# Patient Record
Sex: MALE | Race: OTHER | NOT HISPANIC OR LATINO | URBAN - METROPOLITAN AREA
[De-identification: names, ages, dates, MRNs, and addresses within clinical notes are randomized per-mention and may not be internally consistent; named-entity substitution may affect disease eponyms.]

---

## 2020-06-23 ENCOUNTER — INPATIENT (INPATIENT)
Facility: HOSPITAL | Age: 30
LOS: 21 days | Discharge: HOME | End: 2020-07-15
Attending: PSYCHIATRY & NEUROLOGY | Admitting: PSYCHIATRY & NEUROLOGY
Payer: MEDICAID

## 2020-06-23 VITALS
RESPIRATION RATE: 20 BRPM | SYSTOLIC BLOOD PRESSURE: 132 MMHG | DIASTOLIC BLOOD PRESSURE: 70 MMHG | HEART RATE: 89 BPM | TEMPERATURE: 99 F | OXYGEN SATURATION: 97 %

## 2020-06-23 DIAGNOSIS — F29 UNSPECIFIED PSYCHOSIS NOT DUE TO A SUBSTANCE OR KNOWN PHYSIOLOGICAL CONDITION: ICD-10-CM

## 2020-06-23 LAB
AMMONIA BLD-MCNC: <10 UMOL/L — LOW (ref 11–55)
ANION GAP SERPL CALC-SCNC: 16 MMOL/L — HIGH (ref 7–14)
APAP SERPL-MCNC: <5 UG/ML — LOW (ref 10–30)
APPEARANCE UR: CLEAR — SIGNIFICANT CHANGE UP
BASOPHILS # BLD AUTO: 0.03 K/UL — SIGNIFICANT CHANGE UP (ref 0–0.2)
BASOPHILS NFR BLD AUTO: 0.3 % — SIGNIFICANT CHANGE UP (ref 0–1)
BILIRUB UR-MCNC: NEGATIVE — SIGNIFICANT CHANGE UP
BUN SERPL-MCNC: 17 MG/DL — SIGNIFICANT CHANGE UP (ref 10–20)
CALCIUM SERPL-MCNC: 10.7 MG/DL — HIGH (ref 8.5–10.1)
CHLORIDE SERPL-SCNC: 100 MMOL/L — SIGNIFICANT CHANGE UP (ref 98–110)
CO2 SERPL-SCNC: 23 MMOL/L — SIGNIFICANT CHANGE UP (ref 17–32)
COLOR SPEC: YELLOW — SIGNIFICANT CHANGE UP
CREAT SERPL-MCNC: 1 MG/DL — SIGNIFICANT CHANGE UP (ref 0.7–1.5)
DIFF PNL FLD: NEGATIVE — SIGNIFICANT CHANGE UP
EOSINOPHIL # BLD AUTO: 0.02 K/UL — SIGNIFICANT CHANGE UP (ref 0–0.7)
EOSINOPHIL NFR BLD AUTO: 0.2 % — SIGNIFICANT CHANGE UP (ref 0–8)
ETHANOL SERPL-MCNC: <10 MG/DL — SIGNIFICANT CHANGE UP
GLUCOSE SERPL-MCNC: 106 MG/DL — HIGH (ref 70–99)
GLUCOSE UR QL: NEGATIVE — SIGNIFICANT CHANGE UP
HCT VFR BLD CALC: 46.1 % — SIGNIFICANT CHANGE UP (ref 42–52)
HGB BLD-MCNC: 16.3 G/DL — SIGNIFICANT CHANGE UP (ref 14–18)
IMM GRANULOCYTES NFR BLD AUTO: 0.2 % — SIGNIFICANT CHANGE UP (ref 0.1–0.3)
KETONES UR-MCNC: ABNORMAL
LEUKOCYTE ESTERASE UR-ACNC: NEGATIVE — SIGNIFICANT CHANGE UP
LYMPHOCYTES # BLD AUTO: 1.64 K/UL — SIGNIFICANT CHANGE UP (ref 1.2–3.4)
LYMPHOCYTES # BLD AUTO: 16.4 % — LOW (ref 20.5–51.1)
MCHC RBC-ENTMCNC: 30.3 PG — SIGNIFICANT CHANGE UP (ref 27–31)
MCHC RBC-ENTMCNC: 35.4 G/DL — SIGNIFICANT CHANGE UP (ref 32–37)
MCV RBC AUTO: 85.7 FL — SIGNIFICANT CHANGE UP (ref 80–94)
MONOCYTES # BLD AUTO: 0.4 K/UL — SIGNIFICANT CHANGE UP (ref 0.1–0.6)
MONOCYTES NFR BLD AUTO: 4 % — SIGNIFICANT CHANGE UP (ref 1.7–9.3)
NEUTROPHILS # BLD AUTO: 7.91 K/UL — HIGH (ref 1.4–6.5)
NEUTROPHILS NFR BLD AUTO: 78.9 % — HIGH (ref 42.2–75.2)
NITRITE UR-MCNC: NEGATIVE — SIGNIFICANT CHANGE UP
NRBC # BLD: 0 /100 WBCS — SIGNIFICANT CHANGE UP (ref 0–0)
PH UR: 6 — SIGNIFICANT CHANGE UP (ref 5–8)
PLATELET # BLD AUTO: 266 K/UL — SIGNIFICANT CHANGE UP (ref 130–400)
POTASSIUM SERPL-MCNC: 4.8 MMOL/L — SIGNIFICANT CHANGE UP (ref 3.5–5)
POTASSIUM SERPL-SCNC: 4.8 MMOL/L — SIGNIFICANT CHANGE UP (ref 3.5–5)
PROT UR-MCNC: NEGATIVE — SIGNIFICANT CHANGE UP
RBC # BLD: 5.38 M/UL — SIGNIFICANT CHANGE UP (ref 4.7–6.1)
RBC # FLD: 12 % — SIGNIFICANT CHANGE UP (ref 11.5–14.5)
SALICYLATES SERPL-MCNC: <0.3 MG/DL — LOW (ref 4–30)
SARS-COV-2 RNA SPEC QL NAA+PROBE: SIGNIFICANT CHANGE UP
SODIUM SERPL-SCNC: 139 MMOL/L — SIGNIFICANT CHANGE UP (ref 135–146)
SP GR SPEC: 1.02 — SIGNIFICANT CHANGE UP (ref 1.01–1.02)
UROBILINOGEN FLD QL: SIGNIFICANT CHANGE UP
WBC # BLD: 10.02 K/UL — SIGNIFICANT CHANGE UP (ref 4.8–10.8)
WBC # FLD AUTO: 10.02 K/UL — SIGNIFICANT CHANGE UP (ref 4.8–10.8)

## 2020-06-23 PROCEDURE — 99285 EMERGENCY DEPT VISIT HI MDM: CPT

## 2020-06-23 PROCEDURE — 71046 X-RAY EXAM CHEST 2 VIEWS: CPT | Mod: 26

## 2020-06-23 PROCEDURE — 93010 ELECTROCARDIOGRAM REPORT: CPT

## 2020-06-23 PROCEDURE — 70450 CT HEAD/BRAIN W/O DYE: CPT | Mod: 26

## 2020-06-23 PROCEDURE — 90792 PSYCH DIAG EVAL W/MED SRVCS: CPT | Mod: 95

## 2020-06-23 NOTE — ED BEHAVIORAL HEALTH ASSESSMENT NOTE - HPI (INCLUDE ILLNESS QUALITY, SEVERITY, DURATION, TIMING, CONTEXT, MODIFYING FACTORS, ASSOCIATED SIGNS AND SYMPTOMS)
· Chief Complaint Quote	pt states he can't walk (patient is walking in triage). states he "wants god to take me" and "I want to die" but states no suicide. denies any HI. denies any drug/alcohol use    BAL negative, Patient is a 29 yo unemployed male; domiciled with dad; single; non caregiver; with self reported psych hx of schizophrenia; multiple prior hospitalizations (states most recently at [facility]); denies prior SA or NSSIB; denies alcohol or drug use; denies medical hx; unknown hx of aggression or legal charges who presents via EMS for psychotic behavior. BAL negative, Utox pending. Patient initially reported difficulty with walking, however was noted to be walking around ED. CT head was negative.     On interview, patient is standing very close to the camera and rocking back and forth between left foot and right throughout interview. States that he is very restless and unable to sit down. Reports that he is here in the ED because "I can't walk, I feel unbalanced, my coordination is off". Also, reports intrusive and distressing thoughts. When asked to elaborate, patient quietly reports, "they are attacking my [employer]ec[employer] constantly, I am Scientologist". When asked to give details, patient reports "I don't want to speak of them, I am afraid I am going to hell". States that he is scared he is going to be judged in afterlife. Denies AH and perseverates that these are not voices or hallucinations but "intrusive thoughts". Reports his mood, poor energy/concentration/appetite. Denies any suicidal thoughts, behaviors, or intentions. Denies any prior SA. Denies any HI. Denies any manic symptoms. Admits to smoking 1 ppd, but denies alcohol or other tobacco use. Reports that he was admitted last because "I felt like I was going to pass away". Not currently in treatment or taking any psychiatric medications.     Date: 6-23-20 Time: 7:16 Clinician Name: Johnson Pt Location: [facility] Pt Name: Umberto Hadley Pt MRN: [id number]    Records checked/sent to BTA: [place] [facility]    Records checked- no data: [facility], Forensic databases (nysdoccslookup and [employer]), [facility], [facility], [facility], [facility], [facility] ED Visits, [facility], [facility], [facility], [facility], [facility], [facility], [facility], [facility], [facility], [facility]

## 2020-06-23 NOTE — ED ADULT TRIAGE NOTE - NS_BH TRG QUESTION3_ED_ALL_ED
This is a  pt Spoke to pt she states she is having lower abd. Pain no fever,chills or vomiting. Pt states she is taking pyriduim,oxybutynin an ibuprofen but states this is not helping with the pain. She is asking if she can get an rx for pain medication. I did advised her that  was gone for the day but I would send a message to  an would call her back with an answer. Pt is aware pain medication can not be called into pharmacy she states her daughter will  rx. Please advise - amalia   No

## 2020-06-23 NOTE — ED ADULT TRIAGE NOTE - CHIEF COMPLAINT QUOTE
pt states he can't walk (patient is walking in triage). states he "wants god to take me" and "I want to die" but states no suicide. denies any HI. denies any drug/alcohol use

## 2020-06-23 NOTE — ED ADULT NURSE NOTE - OBJECTIVE STATEMENT
Patient c/o inability to walk x 3 weeks and "uneasy feeling"- patient ambulating self with a steady gait. Denies any pain or discomfort, patient denies any HI/SI at this time. Patient states he wishes to kill himself in triage 1:1 constant obs initiated in triage. PAtient states he has a PMH of schizophrenia but stopped taking his meds months ago. Patient calm at this time. Patient denies drug or alcohol use. Will continue to monitor.

## 2020-06-23 NOTE — ED PROVIDER NOTE - CLINICAL SUMMARY MEDICAL DECISION MAKING FREE TEXT BOX
Pt presenting with worsening paranoia and persecutory thoughts. labs ekg reviewed. sp psychiatry eval. will admit for further care.

## 2020-06-23 NOTE — ED PROVIDER NOTE - PHYSICAL EXAMINATION
CONSTITUTIONAL: Well-appearing; well-nourished; in no apparent distress.   EYES: PERRL; EOM intact. .   NECK: Supple; non-tender; no cervical lymphadenopathy.   CARDIOVASCULAR: Normal S1, S2; no murmurs, rubs, or gallops.   RESPIRATORY: Normal chest excursion with respiration; breath sounds clear and equal bilaterally; no wheezes, rhonchi, or rales.  GI/: Normal bowel sounds; non-distended; non-tender; no palpable organomegaly.   MS: No evidence of trauma or deformity.  Normal ROM in all four extremities; non-tender to palpation; distal pulses are normal.   SKIN: Normal for age and race; warm; dry; good turgor; no apparent lesions or exudate.   NEURO/PSYCH: A & O x 3; grossly unremarkable. mood and manner are appropriate. Grooming and personal hygiene are appropriate. No apparent thoughts of harm to self or others. No focal deficits. No facial droop. NO tongue deviation. Cerebellar intact. Normal gait. Strength equal b/l 5/5 throughout. Sensation intact. Neg romberg

## 2020-06-23 NOTE — ED PROVIDER NOTE - OBJECTIVE STATEMENT
30 year old M hx of anxiety, schizoaffective d/o (stopped taking meds x mo ago) c/o mult complaints. Pt sts does not feel right and is having "bad thought." Pt sts he is afraid that God will punish him because of his bad thoughts. Pt also sts he is unable to walk and has been to [facility] recently for symptoms. Pt ambulating well in ED with steady gait. Currently denies any drug/etoh use, SI, HI, past suicide attempts, hallucinations, leg/back pain, injuries/trauma, chest pain, sob.

## 2020-06-23 NOTE — ED BEHAVIORAL HEALTH ASSESSMENT NOTE - DETAILS
Cristians denies si or prior SA "can't walk" patient activated EMS handoff provided contact attempted

## 2020-06-23 NOTE — ED BEHAVIORAL HEALTH ASSESSMENT NOTE - OTHER
external billing see above standing very close to camera despite redirection constantly rocking from side to side per ED, patient has normal gait, unable to assess because patient is so close to camera bad denies AVH, but appears to be responding

## 2020-06-23 NOTE — ED BEHAVIORAL HEALTH NOTE - BEHAVIORAL HEALTH NOTE
===================  PRE-HOSPITAL COURSE  ===================    SOURCE: Chart    DETAILS:  Arrived via EMS by himself complaining of not being able to walk for several weeks made statements that he “wants go to take me” and “I want to die”. Patient also noted to be walking in triage     ============  ED COURSE   ============    SOURCE: ED RN, Chart    ARRIVAL: EMS    BELONGINGS: No items of note    BEHAVIOR: Patient arrived alert and oriented x3, patient was somewhat malodorous. Patient reported that he can’t walk despite ambulating in the ED. He admitted to feeling “uneasy” but otherwise no psychiatric complaints and denying SI/HI. Patient didn’t appear overtly bizarre or psychotic to RN, thought process seemed linear and speech WNL. Patient remained in good behavioral control while in the ED, he was able to eat during ED course.    TREATMENT: No PRN psychiatric medication prior to assessment    VISITORS: None    ========================  COLLATERAL  ========================    NAME: Abiola    NUMBER: [phone]    RELATIONSHIP: Father    COMMENTS: Left voicemail requesting return call ===================  PRE-HOSPITAL COURSE  ===================    SOURCE: Chart    DETAILS:  Arrived via EMS by himself complaining of not being able to walk for several weeks made statements that he “wants go to take me” and “I want to die”. Patient also noted to be walking in triage     ============  ED COURSE   ============    SOURCE: ED RN, Chart    ARRIVAL: EMS    BELONGINGS: No items of note    BEHAVIOR: Patient arrived alert and oriented x3, patient was somewhat malodorous. Patient reported that he can’t walk despite ambulating in the ED. He admitted to feeling “uneasy” but otherwise no psychiatric complaints and denying SI/HI. Patient didn’t appear overtly bizarre or psychotic to RN, thought process seemed linear and speech WNL. Patient remained in good behavioral control while in the ED but noted to be restless at times and pacing, he was able to eat during ED course.    TREATMENT: No PRN psychiatric medication prior to assessment    VISITORS: None    ========================  COLLATERAL  ========================    NAME: Abiola    NUMBER: [phone]    RELATIONSHIP: Father    COMMENTS: Left voicemail requesting return call

## 2020-06-23 NOTE — ED ADULT NURSE NOTE - NSIMPLEMENTINTERV_GEN_ALL_ED
Implemented All Universal Safety Interventions:  [place] to call system. Call bell, personal items and telephone within reach. Instruct patient to call for assistance. Room bathroom lighting operational. Non-slip footwear when patient is off stretcher. Physically safe environment: no spills, clutter or unnecessary equipment. Stretcher in lowest position, wheels locked, appropriate side rails in place.

## 2020-06-23 NOTE — ED PROVIDER NOTE - NS ED ROS FT
Constitutional: no fever, chills, no recent weight loss, change in appetite or malaise  Cardiac: No chest pain, SOB or edema.  Respiratory: No cough or respiratory distress  GI: No nausea, vomiting, diarrhea or abdominal pain.  : No dysuria, frequency, urgency or hematuria  MS: Pt sts having difficulty walking  Neuro: No headache or weakness. No LOC.  Skin: No skin rash.  Psych: No SI/HI, hallucinations, delusions  Endocrine: No history of thyroid disease or diabetes.  Except as documented in the HPI, all other systems are negative.

## 2020-06-23 NOTE — ED PROVIDER NOTE - ATTENDING CONTRIBUTION TO CARE
31 yo male with pmh of schizophrenia presents to ER for "intrusive thoughts" and states "I want to die'. Pt with complaint of not being able to walk, and states he went to [facility] and [facility] for eval--pt is however walking around without distress, no neuro complaints. Pt having bizarre thoughts, keeps repeating his inability to ambulate while ambulating, saying he's not feeling right, that he feels persecuted. Pt has no signs of self injury. Denies taking his meds, admits to marijuana use. Not following up with his psych MD. Exam as per PA note. Will check labs, ekg, consult psych.

## 2020-06-23 NOTE — ED PROVIDER NOTE - PROGRESS NOTE DETAILS
Telepsych to be called again Telepsych MD Rocha wants to admit pt but no beds, offer pt resperidal, and do behaviour health hold for now Telepsych MD Ornelas wants to admit pt but no beds, offer pt resperidal, and do behaviour health hold for now Pt s/o to me by Dr. Guzman pending bed availability for psych admission. Pt s/o to Dr. Lockwood to pending psych reeval and bed availability.

## 2020-06-23 NOTE — ED BEHAVIORAL HEALTH ASSESSMENT NOTE - PSYCHIATRIC ISSUES AND PLAN (INCLUDE STANDING AND PRN MEDICATION)
Start risperdal 1 mg BID, patient refuses can offer ativan 1 mg. PRNS: haldol 5mg, ativan 2mg, diphenhydramine 50mg, PO/IM, Q6H for Agitation

## 2020-06-24 DIAGNOSIS — F25.0 SCHIZOAFFECTIVE DISORDER, BIPOLAR TYPE: ICD-10-CM

## 2020-06-24 PROCEDURE — 90792 PSYCH DIAG EVAL W/MED SRVCS: CPT | Mod: GC

## 2020-06-24 RX ORDER — BENZTROPINE MESYLATE 1 MG
0.5 TABLET ORAL
Refills: 0 | Status: DISCONTINUED | OUTPATIENT
Start: 2020-06-24 | End: 2020-07-15

## 2020-06-24 RX ORDER — RISPERIDONE 4 MG/1
1 TABLET ORAL
Refills: 0 | Status: DISCONTINUED | OUTPATIENT
Start: 2020-06-24 | End: 2020-06-30

## 2020-06-24 RX ORDER — HYDROXYZINE HCL 10 MG
50 TABLET ORAL AT BEDTIME
Refills: 0 | Status: DISCONTINUED | OUTPATIENT
Start: 2020-06-24 | End: 2020-07-13

## 2020-06-24 RX ORDER — HALOPERIDOL DECANOATE 100 MG/ML
2.5 INJECTION INTRAMUSCULAR EVERY 6 HOURS
Refills: 0 | Status: DISCONTINUED | OUTPATIENT
Start: 2020-06-24 | End: 2020-06-24

## 2020-06-24 RX ORDER — HALOPERIDOL DECANOATE 100 MG/ML
5 INJECTION INTRAMUSCULAR EVERY 6 HOURS
Refills: 0 | Status: DISCONTINUED | OUTPATIENT
Start: 2020-06-24 | End: 2020-07-15

## 2020-06-24 RX ORDER — HYDROXYZINE HCL 10 MG
50 TABLET ORAL EVERY 6 HOURS
Refills: 0 | Status: DISCONTINUED | OUTPATIENT
Start: 2020-06-24 | End: 2020-06-24

## 2020-06-24 RX ADMIN — RISPERIDONE 1 MILLIGRAM(S): 4 TABLET ORAL at 20:13

## 2020-06-24 RX ADMIN — Medication 0.5 MILLIGRAM(S): at 20:13

## 2020-06-24 NOTE — ED ADULT NURSE REASSESSMENT NOTE - NS ED NURSE REASSESS COMMENT FT1
1:1 constant observation initiated in triage
patient awake/alert/oriented. v/s stable. no acute distress.  safety maintained. 1:1 constant observation continues
Psych-Intern called to check how pt is doing since she last , pt was asleep then, woke up and said he wants to go home. MD finally called, spoke to pt, who cut it short went back to sleep.
Received from outgoing RN alert and anxious . pt wa talking to Tele psych, with 1:1aid by door way. Pt came out 10mins later. He is currently pacing back and forth, looking  anxious, not talking to anyone. Safety precautions in place.

## 2020-06-24 NOTE — ED BEHAVIORAL HEALTH NOTE - BEHAVIORAL HEALTH NOTE
Chief Complaint  " I came for medical".    Interval History  Patient seen and interviewed today. he is observed to be internally preoccupied and responding to internal stimuli.   He reports that he did not come to the Ed for psychiatric reasons but because he couldn't walk. Patient however reports that he can walk now and wants to go home. Patient states" my mum will be very upset with me". Patient denies hearing voices or feeling that people are against him. he reports that he does not take any psychiatric medication because they do not work and they don't help his anxiety. He denies acute symptoms of caridad and depression and denies suicidal thoughts , intent or plan.   Patient reports that he has an act team and he received his injection a couple of weeks ago. When asked for the name of his ACT team and their phone number, patient reports that he did not have this information. When asked for his mother's name and phone number , patient reports that he does not have the information. Patient became agitated however he was verbally re-directable.     Mental Status Examination  Patient is awake and alert , observed to be able to walk , appears dishevelled and unkempt, well oriented in time, place and persons, speech is normal in rate , volume, quality , quality, mood is " i'm fine ", Affect is constricted , thought process is concrete , thought content - denies suicidal ideations, denies paranoids delusions, perceptions - denies auditory hallucinations , insight and judgement is poor     Vitals Signs   Vital Signs Last 24 Hrs  T(C): 36.2 (24 Jun 2020 08:30), Max: 37.2 (23 Jun 2020 15:49)  T(F): 97.2 (24 Jun 2020 08:30), Max: 99 (23 Jun 2020 15:49)  HR: 78 (24 Jun 2020 08:30) (78 - 89)  BP: 112/59 (24 Jun 2020 08:30) (112/59 - 132/70)  BP(mean): --  RR: 18 (24 Jun 2020 08:30) (18 - 20)  SpO2: 98% (24 Jun 2020 08:30) (97% - 98%)    Labs:                        16.3   10.02 )-----------( 266      ( 23 Jun 2020 16:29 )             46.1       06-23    139  |  100  |  17  ----------------------------<  106<H>  4.8   |  23  |  1.0    Ca    10.7<H>      23 Jun 2020 16:29        Assessment:  Mr Hadley is a 30 year old man with a documented history of Schizophrenia who presented to the ED for the evaluation of difficulty walking. Psychiatry consult was called for the evaluation of psychosis. patient was initially seen by the telepsychiatry service.   Patient is disorganised in behavior and thought, does not seem to be able to take care of himself , it is unclear if his reported difficulty walking is a delusion versus actual physical symptom, appears internally preoccupied and responding to internal stimuli. it is unclear if patient is in psychiatric treatment as he reports with an ACT team as there doesn't appear to be any such records.   At this time, patient is considered a danger to himself and others and needs inpatient psychiatric hospitalization for medication management and symptom stabilization. Patient will be stated on Risperdal 1mg p.o BID  psychosis and Cogentin 0.5mg P.O BID for possible EPS. It will be beneficial to obtain collateral information form his family and to confirm if he indeed has an ACT team and to confirm if he indeed received a long acting injectable medication recently. Patient will benefit from a medical or neurology consult to determine if he there is a medical cause for his reported difficultuy walking.     Plan:   1. Recommend starting psychotropic medication   Risperdal 1mg p.o BID   Cogentin 0.5mg P.O BID  2. Transfer to the Inpatient psychiatric unit upon bed availability and medical clearance   3. Recommend contacting patient's family and ACT team for collateral information.

## 2020-06-24 NOTE — ED BEHAVIORAL HEALTH NOTE - BEHAVIORAL HEALTH NOTE
Reassessment  Spoke with nurse caring for patient, who reports that patient was pacing and anxious earlier. Asking why he has to say and stating that came to the hospital for his walk. Requested a stretcher to lay down in. On interview, patient reports that medications don't tell him and that he needs an MRI to see why he can't walk. Continues to be bizarre and perseverate on walking.       Patient is a 29 yo unemployed male; domiciled with dad; single; non caregiver; with self reported psych hx of schizophrenia; multiple prior hospitalizations (states most recently at [facility]); denies prior SA or NSSIB; denies alcohol or drug use; denies medical hx; unknown hx of aggression or legal charges who presents via EMS for psychotic behavior. BAL negative, Utox pending. Patient initially reported difficulty with walking, however was noted to be walking around ED. CT head was negative. On interview, patient is endorsing paranoid and Jew delusions with PMA. Waiting call back from dad for collateral. Holding for bed with planning involuntary admission if patient continues to be this symptomatic pending bed availability and Covid testing.    -holding for bed  -start risperdal 1 mg BID, PRNS: haldol 5mg, ativan 2mg, diphenhydramine 50mg, PO/IM, Q6H for Agitation

## 2020-06-24 NOTE — PROGRESS NOTE BEHAVIORAL HEALTH - NSBHFUPINTERVALCCFT_PSY_A_CORE
chart reviewed and patient interviewed. he admits to not taking his meds, but agrees to comply with meds and treatment plan "as long as I don't have to stay too long". No s/h ideation. pt is disorganized and vague, but no overt delusions or s/h ideation

## 2020-06-25 PROCEDURE — 99232 SBSQ HOSP IP/OBS MODERATE 35: CPT

## 2020-06-25 PROCEDURE — 99231 SBSQ HOSP IP/OBS SF/LOW 25: CPT

## 2020-06-25 RX ADMIN — HALOPERIDOL DECANOATE 5 MILLIGRAM(S): 100 INJECTION INTRAMUSCULAR at 13:38

## 2020-06-25 RX ADMIN — RISPERIDONE 1 MILLIGRAM(S): 4 TABLET ORAL at 20:23

## 2020-06-25 RX ADMIN — Medication 2 MILLIGRAM(S): at 16:28

## 2020-06-25 RX ADMIN — RISPERIDONE 1 MILLIGRAM(S): 4 TABLET ORAL at 08:59

## 2020-06-25 RX ADMIN — Medication 0.5 MILLIGRAM(S): at 20:23

## 2020-06-25 RX ADMIN — Medication 0.5 MILLIGRAM(S): at 08:59

## 2020-06-25 NOTE — CONSULT NOTE ADULT - PROBLEM SELECTOR RECOMMENDATION 9
continue present treatment as per psych plan as reviewed  Medically stable with no new changes in treatment  will continue to monitor medical status while being treated on psych  labs all ok

## 2020-06-25 NOTE — CONSULT NOTE ADULT - SUBJECTIVE AND OBJECTIVE BOX
ANTONINA SHAFFER  30y  Male      Patient is a 30y old  Male who presents with a chief complaint of   HPI:    INTERVAL HPI/OVERNIGHT EVENTS:  HEALTH ISSUES - PROBLEM Dx:  Schizoaffective disorder, bipolar type  Psychosis, unspecified psychosis type        PAST MEDICAL & SURGICAL HISTORY:  Schizophrenia    FAMILY HISTORY:    benztropine 0.5 milliGRAM(s) Oral two times a day  haloperidol     Tablet 5 milliGRAM(s) Oral every 6 hours PRN  hydrOXYzine hydrochloride 50 milliGRAM(s) Oral at bedtime PRN  LORazepam     Tablet 2 milliGRAM(s) Oral every 6 hours PRN  risperiDONE   Tablet 1 milliGRAM(s) Oral two times a day      REVIEW OF SYSTEMS:  CONSTITUTIONAL: No fever, weight loss, or fatigue  EYES: No eye pain, visual disturbances, or discharge  ENMT:  No difficulty hearing, tinnitus, vertigo; No sinus or throat pain  NECK: No pain or stiffness  BREASTS: No pain, masses, or nipple discharge  RESPIRATORY: No cough, wheezing, chills or hemoptysis; No shortness of breath  CARDIOVASCULAR: No chest pain, palpitations, dizziness, or leg swelling  GASTROINTESTINAL: No abdominal or epigastric pain. No nausea, vomiting, or hematemesis; No diarrhea or constipation. No melena or hematochezia.  GENITOURINARY: No dysuria, frequency, hematuria, or incontinence  NEUROLOGICAL: No headaches, memory loss, loss of strength, numbness, or tremors  SKIN: No itching, burning, rashes, or lesions   LYMPH NODES: No enlarged glands  ENDOCRINE: No heat or cold intolerance; No hair loss  MUSCULOSKELETAL: No joint pain or swelling; No muscle, back, or extremity pain  PSYCHIATRIC: as per hpi and previous psych history  HEME/LYMPH: No easy bruising, or bleeding gums  ALLERY AND IMMUNOLOGIC: No hives or eczema    T(C): 35.4 (20 @ 21:44), Max: 36.4 (20 @ 14:25)  HR: 78 (20 @ 21:44) (78 - 84)  BP: 99/58 (20 @ 21:44) (99/58 - 132/61)  RR: 16 (20 @ 21:44) (16 - 16)  SpO2: --  Wt(kg): --Vital Signs Last 24 Hrs  T(C): 35.4 (2020 21:44), Max: 36.4 (2020 14:25)  T(F): 95.8 (2020 21:44), Max: 97.6 (2020 14:25)  HR: 78 (2020 21:44) (78 - 84)  BP: 99/58 (2020 21:44) (99/58 - 132/61)  BP(mean): --  RR: 16 (2020 21:44) (16 - 16)  SpO2: --    PHYSICAL EXAM:  GENERAL: NAD,well-developed  HEAD:  Atraumatic, Normocephalic  EYES: EOMI, PERRLA, conjunctiva and sclera clear  ENMT: ; Moist mucous membranes, Good dentition, No lesions  NECK: Supple, No JVD, Normal thyroid  CHEST/LUNG: Clear bs bilaterally; No rales, rhonchi, wheezing  HEART: Regular rate and rhythm; No murmurs, rubs, or gallops  ABDOMEN: Soft, Nontender, Nondistended; Bowel sounds present  EXTREMITIES:  2+ Peripheral Pulses, No clubbing, cyanosis, or edema  LYMPH: No lymphadenopathy noted  SKIN: No rashes or lesions  Neuro: alert  no focal deficits    Consultant(s) Notes Reviewed:  [x ] YES  [ ] NO  Care Discussed with Consultants/Other Providers [ x] YES  [ ] NO    LABS:                        16.3   10.02 )-----------( 266      ( 2020 16:29 )             46.1     06-    139  |  100  |  17  ----------------------------<  106<H>  4.8   |  23  |  1.0    Ca    10.7<H>      2020 16:29        Urinalysis Basic - ( 2020 16:29 )    Color: Yellow / Appearance: Clear / S.019 / pH: x  Gluc: x / Ketone: Small  / Bili: Negative / Urobili: <2 mg/dL   Blood: x / Protein: Negative / Nitrite: Negative   Leuk Esterase: Negative / RBC: x / WBC x   Sq Epi: x / Non Sq Epi: x / Bacteria: x      CAPILLARY BLOOD GLUCOSE            Urinalysis Basic - ( 2020 16:29 )    Color: Yellow / Appearance: Clear / S.019 / pH: x  Gluc: x / Ketone: Small  / Bili: Negative / Urobili: <2 mg/dL   Blood: x / Protein: Negative / Nitrite: Negative   Leuk Esterase: Negative / RBC: x / WBC x   Sq Epi: x / Non Sq Epi: x / Bacteria: x        RADIOLOGY & ADDITIONAL TESTS:    Imaging Personally Reviewed:  [ ] YES  [ ] NO

## 2020-06-25 NOTE — PROGRESS NOTE BEHAVIORAL HEALTH - NSBHFUPINTERVALCCFT_PSY_A_CORE
Pt is calm and pleasant on approach. No s/h ideation. Pt complying with meds; no overt psychosis. Message left with father; no response yet.

## 2020-06-26 LAB
AMPHET UR-MCNC: NEGATIVE — SIGNIFICANT CHANGE UP
BARBITURATES, URINE.: NEGATIVE — SIGNIFICANT CHANGE UP
BENZODIAZ UR-MCNC: NEGATIVE — SIGNIFICANT CHANGE UP
COCAINE METAB.OTHER UR-MCNC: NEGATIVE — SIGNIFICANT CHANGE UP
CREATININE, URINE THERAPEUTIC: 117.3 MG/DL — SIGNIFICANT CHANGE UP
METHADONE UR-MCNC: NEGATIVE — SIGNIFICANT CHANGE UP
METHAQUALONE UR QL: NEGATIVE — SIGNIFICANT CHANGE UP
METHAQUALONE UR-MCNC: NEGATIVE — SIGNIFICANT CHANGE UP
OPIATES UR-MCNC: NEGATIVE — SIGNIFICANT CHANGE UP
PCP UR-MCNC: NEGATIVE — SIGNIFICANT CHANGE UP
PROPOXYPH UR QL: NEGATIVE — SIGNIFICANT CHANGE UP
THC UR QL: NEGATIVE — SIGNIFICANT CHANGE UP

## 2020-06-26 PROCEDURE — 99231 SBSQ HOSP IP/OBS SF/LOW 25: CPT

## 2020-06-26 RX ADMIN — RISPERIDONE 1 MILLIGRAM(S): 4 TABLET ORAL at 20:21

## 2020-06-26 RX ADMIN — Medication 0.5 MILLIGRAM(S): at 20:21

## 2020-06-26 RX ADMIN — Medication 0.5 MILLIGRAM(S): at 08:20

## 2020-06-26 RX ADMIN — RISPERIDONE 1 MILLIGRAM(S): 4 TABLET ORAL at 08:20

## 2020-06-26 NOTE — PROGRESS NOTE BEHAVIORAL HEALTH - NSBHFUPINTERVALCCFT_PSY_A_CORE
Pt is complying with meds. No s/h ideation. poverty of thought and derailment are evident, but pt is less anxious.  No clear delusions evident

## 2020-06-27 PROCEDURE — 99231 SBSQ HOSP IP/OBS SF/LOW 25: CPT

## 2020-06-27 RX ADMIN — Medication 0.5 MILLIGRAM(S): at 20:02

## 2020-06-27 RX ADMIN — RISPERIDONE 1 MILLIGRAM(S): 4 TABLET ORAL at 20:02

## 2020-06-27 RX ADMIN — Medication 0.5 MILLIGRAM(S): at 08:16

## 2020-06-27 RX ADMIN — RISPERIDONE 1 MILLIGRAM(S): 4 TABLET ORAL at 08:16

## 2020-06-27 NOTE — PROGRESS NOTE BEHAVIORAL HEALTH - NSBHFUPINTERVALHXFT_PSY_A_CORE
chart reviewed , discussed with staff , pt seen .  p[t lying in bed , religiously preoccupied Pt is complying with meds. No s/h ideation. poverty of thought and derailment are evident, but pt is less anxious.  No clear delusions evident

## 2020-06-28 PROCEDURE — 99231 SBSQ HOSP IP/OBS SF/LOW 25: CPT

## 2020-06-28 RX ADMIN — Medication 0.5 MILLIGRAM(S): at 08:52

## 2020-06-28 RX ADMIN — RISPERIDONE 1 MILLIGRAM(S): 4 TABLET ORAL at 08:52

## 2020-06-28 RX ADMIN — RISPERIDONE 1 MILLIGRAM(S): 4 TABLET ORAL at 20:20

## 2020-06-28 RX ADMIN — Medication 0.5 MILLIGRAM(S): at 20:20

## 2020-06-28 NOTE — PROGRESS NOTE BEHAVIORAL HEALTH - NSBHFUPINTERVALHXFT_PSY_A_CORE
Patient seen and evaluated. Patient reports that he is feeling "fine." He denies any new symptoms or concerns. Patient reports that he "just wants to sleep." Patient denies any SI/HI/AVH.

## 2020-06-28 NOTE — PROGRESS NOTE BEHAVIORAL HEALTH - SUMMARY
Patient is a 30 year old man with a documented history of Schizophrenia who initially presented to the ED for the evaluation of difficulty walking. Patient was admitted to the IPP unit for psychosis and inability to care for self. Today, patient does not appear to be responding to internal stimuli. He wanted to go back to sleep. Patient is on a low dose of Risperdal, may need to titrate but does not appear to have any active concerns at this time.    1. Risperdal 1 mg BID po  2. Ativan/Haldol prn for agitation

## 2020-06-28 NOTE — PROGRESS NOTE BEHAVIORAL HEALTH - CASE SUMMARY
29 yo man admitted for exacerbation of chronic psychosis. Pt examined and chart reviewed, case discussed with Dr Snowden. Pt is responding to treatment initiated on admission. I agree with Dr Snowden's plan to continue current treatment regimen.

## 2020-06-29 PROCEDURE — 99231 SBSQ HOSP IP/OBS SF/LOW 25: CPT

## 2020-06-29 RX ORDER — RISPERIDONE 4 MG/1
1 TABLET ORAL AT BEDTIME
Refills: 0 | Status: DISCONTINUED | OUTPATIENT
Start: 2020-06-29 | End: 2020-06-30

## 2020-06-29 RX ADMIN — RISPERIDONE 1 MILLIGRAM(S): 4 TABLET ORAL at 08:18

## 2020-06-29 RX ADMIN — Medication 0.5 MILLIGRAM(S): at 20:23

## 2020-06-29 RX ADMIN — Medication 2 MILLIGRAM(S): at 18:58

## 2020-06-29 RX ADMIN — Medication 0.5 MILLIGRAM(S): at 08:18

## 2020-06-29 RX ADMIN — RISPERIDONE 1 MILLIGRAM(S): 4 TABLET ORAL at 20:24

## 2020-06-29 NOTE — PROGRESS NOTE BEHAVIORAL HEALTH - NSBHFUPINTERVALHXFT_PSY_A_CORE
will increase risperdal to address negative symptoms will increase risperdal to address delusions and negative symptoms

## 2020-06-29 NOTE — PROGRESS NOTE BEHAVIORAL HEALTH - NSBHFUPINTERVALCCFT_PSY_A_CORE
Pt is complying with meds and treatment plan.  He is isolative and says he feels "ok", and denies hallucinations.  No s/h ideation.  No contact yet from family. I gave patient my phone number to pass on and he says " they don't like to be bothered". Pt is complying with meds and treatment plan.  He is isolative and says he feels "ok", and denies hallucinations.  No s/h ideation, but he remains concerned about "bad" intrusive thoughts, and asks if G-d will punsih him.  No contact yet from family. I gave patient my phone number to pass on and he says " they don't like to be bothered".

## 2020-06-30 PROCEDURE — 99231 SBSQ HOSP IP/OBS SF/LOW 25: CPT

## 2020-06-30 RX ORDER — RISPERIDONE 4 MG/1
2 TABLET ORAL EVERY 12 HOURS
Refills: 0 | Status: DISCONTINUED | OUTPATIENT
Start: 2020-06-30 | End: 2020-07-08

## 2020-06-30 RX ADMIN — HALOPERIDOL DECANOATE 5 MILLIGRAM(S): 100 INJECTION INTRAMUSCULAR at 21:15

## 2020-06-30 RX ADMIN — Medication 2 MILLIGRAM(S): at 21:15

## 2020-06-30 RX ADMIN — Medication 0.5 MILLIGRAM(S): at 08:21

## 2020-06-30 RX ADMIN — RISPERIDONE 1 MILLIGRAM(S): 4 TABLET ORAL at 08:21

## 2020-06-30 RX ADMIN — RISPERIDONE 2 MILLIGRAM(S): 4 TABLET ORAL at 20:23

## 2020-06-30 RX ADMIN — Medication 0.5 MILLIGRAM(S): at 20:23

## 2020-06-30 NOTE — PROGRESS NOTE BEHAVIORAL HEALTH - NSBHFUPINTERVALCCFT_PSY_A_CORE
no s/h ideation. Pt pleasant on approach, but remains concerned about evil thoughts he is having toward G-d.  No behavior issues.  I spoke with both parents (); neither was able to give me significant history or med hx.

## 2020-07-01 PROCEDURE — 99231 SBSQ HOSP IP/OBS SF/LOW 25: CPT

## 2020-07-01 RX ORDER — NICOTINE POLACRILEX 2 MG
2 GUM BUCCAL
Refills: 0 | Status: DISCONTINUED | OUTPATIENT
Start: 2020-07-01 | End: 2020-07-15

## 2020-07-01 RX ADMIN — RISPERIDONE 2 MILLIGRAM(S): 4 TABLET ORAL at 08:15

## 2020-07-01 RX ADMIN — Medication 0.5 MILLIGRAM(S): at 08:15

## 2020-07-01 RX ADMIN — HALOPERIDOL DECANOATE 5 MILLIGRAM(S): 100 INJECTION INTRAMUSCULAR at 20:20

## 2020-07-01 RX ADMIN — RISPERIDONE 2 MILLIGRAM(S): 4 TABLET ORAL at 20:19

## 2020-07-01 RX ADMIN — Medication 0.5 MILLIGRAM(S): at 20:19

## 2020-07-01 NOTE — PROGRESS NOTE BEHAVIORAL HEALTH - NSBHFUPINTERVALCCFT_PSY_A_CORE
Pt is disorganized and isolative.  He remains preoccupied with bad thoughts of a Jehovah's witness nature, but cannot/will not discuss in detail.  Will continue risperdal titration

## 2020-07-02 PROCEDURE — 99231 SBSQ HOSP IP/OBS SF/LOW 25: CPT

## 2020-07-02 RX ORDER — HALOPERIDOL DECANOATE 100 MG/ML
5 INJECTION INTRAMUSCULAR ONCE
Refills: 0 | Status: COMPLETED | OUTPATIENT
Start: 2020-07-02 | End: 2020-07-02

## 2020-07-02 RX ADMIN — RISPERIDONE 2 MILLIGRAM(S): 4 TABLET ORAL at 08:07

## 2020-07-02 RX ADMIN — Medication 50 MILLIGRAM(S): at 20:00

## 2020-07-02 RX ADMIN — HALOPERIDOL DECANOATE 5 MILLIGRAM(S): 100 INJECTION INTRAMUSCULAR at 16:55

## 2020-07-02 RX ADMIN — Medication 0.5 MILLIGRAM(S): at 20:01

## 2020-07-02 RX ADMIN — RISPERIDONE 2 MILLIGRAM(S): 4 TABLET ORAL at 20:01

## 2020-07-02 RX ADMIN — Medication 0.5 MILLIGRAM(S): at 08:07

## 2020-07-02 RX ADMIN — Medication 1 MILLIGRAM(S): at 17:25

## 2020-07-02 RX ADMIN — Medication 2 MILLIGRAM(S): at 21:13

## 2020-07-02 RX ADMIN — HALOPERIDOL DECANOATE 5 MILLIGRAM(S): 100 INJECTION INTRAMUSCULAR at 21:13

## 2020-07-02 RX ADMIN — Medication 2 MILLIGRAM(S): at 19:16

## 2020-07-02 NOTE — PROGRESS NOTE BEHAVIORAL HEALTH - AFFECT RANGE
Constricted

## 2020-07-02 NOTE — PROGRESS NOTE BEHAVIORAL HEALTH - NSBHFUPINTERVALCCFT_PSY_A_CORE
Pt is pleasant on approach.  No s/h ideation .  He has some insight regarding need for med compliance, and is slightly less religiously preoccupied and concerned about "bad thoughts'.   No hallucinations

## 2020-07-03 RX ADMIN — Medication 0.5 MILLIGRAM(S): at 08:24

## 2020-07-03 RX ADMIN — RISPERIDONE 2 MILLIGRAM(S): 4 TABLET ORAL at 20:19

## 2020-07-03 RX ADMIN — Medication 2 MILLIGRAM(S): at 18:38

## 2020-07-03 RX ADMIN — RISPERIDONE 2 MILLIGRAM(S): 4 TABLET ORAL at 08:23

## 2020-07-03 RX ADMIN — Medication 0.5 MILLIGRAM(S): at 20:19

## 2020-07-04 RX ADMIN — Medication 2 MILLIGRAM(S): at 13:55

## 2020-07-04 RX ADMIN — RISPERIDONE 2 MILLIGRAM(S): 4 TABLET ORAL at 20:32

## 2020-07-04 RX ADMIN — Medication 0.5 MILLIGRAM(S): at 20:32

## 2020-07-04 RX ADMIN — HALOPERIDOL DECANOATE 5 MILLIGRAM(S): 100 INJECTION INTRAMUSCULAR at 12:11

## 2020-07-04 RX ADMIN — Medication 50 MILLIGRAM(S): at 22:03

## 2020-07-04 RX ADMIN — Medication 1 MILLIGRAM(S): at 20:48

## 2020-07-04 RX ADMIN — Medication 2 MILLIGRAM(S): at 18:27

## 2020-07-04 RX ADMIN — RISPERIDONE 2 MILLIGRAM(S): 4 TABLET ORAL at 08:41

## 2020-07-04 RX ADMIN — Medication 0.5 MILLIGRAM(S): at 08:41

## 2020-07-04 RX ADMIN — Medication 50 MILLIGRAM(S): at 13:00

## 2020-07-05 RX ADMIN — Medication 50 MILLIGRAM(S): at 20:02

## 2020-07-05 RX ADMIN — HALOPERIDOL DECANOATE 5 MILLIGRAM(S): 100 INJECTION INTRAMUSCULAR at 20:56

## 2020-07-05 RX ADMIN — Medication 0.5 MILLIGRAM(S): at 08:29

## 2020-07-05 RX ADMIN — Medication 1 MILLIGRAM(S): at 20:56

## 2020-07-05 RX ADMIN — RISPERIDONE 2 MILLIGRAM(S): 4 TABLET ORAL at 20:02

## 2020-07-05 RX ADMIN — Medication 0.5 MILLIGRAM(S): at 20:02

## 2020-07-05 RX ADMIN — RISPERIDONE 2 MILLIGRAM(S): 4 TABLET ORAL at 08:29

## 2020-07-06 PROCEDURE — 99231 SBSQ HOSP IP/OBS SF/LOW 25: CPT

## 2020-07-06 RX ADMIN — HALOPERIDOL DECANOATE 5 MILLIGRAM(S): 100 INJECTION INTRAMUSCULAR at 21:40

## 2020-07-06 RX ADMIN — Medication 0.5 MILLIGRAM(S): at 08:22

## 2020-07-06 RX ADMIN — RISPERIDONE 2 MILLIGRAM(S): 4 TABLET ORAL at 08:22

## 2020-07-06 RX ADMIN — Medication 0.5 MILLIGRAM(S): at 20:28

## 2020-07-06 RX ADMIN — Medication 1 MILLIGRAM(S): at 21:40

## 2020-07-06 RX ADMIN — RISPERIDONE 2 MILLIGRAM(S): 4 TABLET ORAL at 20:28

## 2020-07-06 RX ADMIN — Medication 50 MILLIGRAM(S): at 20:28

## 2020-07-06 NOTE — PROGRESS NOTE BEHAVIORAL HEALTH - NSBHFUPINTERVALCCFT_PSY_A_CORE
Mood has improved; no s/h ideation. ADLs have improved, and pt is less religiously preoccupied.  No behavior issues . Pt consents to Consta injection prior to d/c

## 2020-07-07 PROCEDURE — 99231 SBSQ HOSP IP/OBS SF/LOW 25: CPT

## 2020-07-07 RX ADMIN — Medication 1 MILLIGRAM(S): at 20:27

## 2020-07-07 RX ADMIN — Medication 0.5 MILLIGRAM(S): at 08:05

## 2020-07-07 RX ADMIN — Medication 2 MILLIGRAM(S): at 20:33

## 2020-07-07 RX ADMIN — Medication 50 MILLIGRAM(S): at 20:27

## 2020-07-07 RX ADMIN — RISPERIDONE 2 MILLIGRAM(S): 4 TABLET ORAL at 08:05

## 2020-07-07 RX ADMIN — HALOPERIDOL DECANOATE 5 MILLIGRAM(S): 100 INJECTION INTRAMUSCULAR at 21:22

## 2020-07-07 RX ADMIN — Medication 0.5 MILLIGRAM(S): at 20:24

## 2020-07-07 RX ADMIN — RISPERIDONE 2 MILLIGRAM(S): 4 TABLET ORAL at 20:25

## 2020-07-07 NOTE — PROGRESS NOTE BEHAVIORAL HEALTH - NSBHFUPINTERVALCCFT_PSY_A_CORE
pt is alert and oriented. His adls have improved, and he is out of his room more, and able to engage in simple conversations without needing reassurance about Tenriism matters and "bad thoughts" . No s/h ideation; no overt delusions

## 2020-07-08 PROCEDURE — 99231 SBSQ HOSP IP/OBS SF/LOW 25: CPT

## 2020-07-08 RX ORDER — RISPERIDONE 4 MG/1
25 TABLET ORAL ONCE
Refills: 0 | Status: COMPLETED | OUTPATIENT
Start: 2020-07-08 | End: 2020-07-08

## 2020-07-08 RX ORDER — RISPERIDONE 4 MG/1
3 TABLET ORAL EVERY 12 HOURS
Refills: 0 | Status: DISCONTINUED | OUTPATIENT
Start: 2020-07-08 | End: 2020-07-15

## 2020-07-08 RX ADMIN — HALOPERIDOL DECANOATE 5 MILLIGRAM(S): 100 INJECTION INTRAMUSCULAR at 22:49

## 2020-07-08 RX ADMIN — Medication 0.5 MILLIGRAM(S): at 08:12

## 2020-07-08 RX ADMIN — Medication 2 MILLIGRAM(S): at 21:26

## 2020-07-08 RX ADMIN — RISPERIDONE 25 MILLIGRAM(S): 4 TABLET ORAL at 13:01

## 2020-07-08 RX ADMIN — Medication 1 MILLIGRAM(S): at 21:42

## 2020-07-08 RX ADMIN — Medication 0.5 MILLIGRAM(S): at 20:18

## 2020-07-08 RX ADMIN — Medication 50 MILLIGRAM(S): at 22:49

## 2020-07-08 RX ADMIN — RISPERIDONE 2 MILLIGRAM(S): 4 TABLET ORAL at 08:12

## 2020-07-08 RX ADMIN — RISPERIDONE 3 MILLIGRAM(S): 4 TABLET ORAL at 20:18

## 2020-07-08 NOTE — PROGRESS NOTE BEHAVIORAL HEALTH - NSBHFUPINTERVALCCFT_PSY_A_CORE
Pt is complying with meds; he agreed to consta injection, which was given today.  No Confucianism preoccupations evident at this time.  ?appearance in evenings is reported. Will continue to monitor

## 2020-07-09 PROCEDURE — 99231 SBSQ HOSP IP/OBS SF/LOW 25: CPT

## 2020-07-09 RX ORDER — HALOPERIDOL DECANOATE 100 MG/ML
5 INJECTION INTRAMUSCULAR
Refills: 0 | Status: DISCONTINUED | OUTPATIENT
Start: 2020-07-09 | End: 2020-07-15

## 2020-07-09 RX ADMIN — RISPERIDONE 3 MILLIGRAM(S): 4 TABLET ORAL at 09:06

## 2020-07-09 RX ADMIN — Medication 0.5 MILLIGRAM(S): at 20:43

## 2020-07-09 RX ADMIN — Medication 0.5 MILLIGRAM(S): at 09:06

## 2020-07-09 RX ADMIN — RISPERIDONE 3 MILLIGRAM(S): 4 TABLET ORAL at 20:43

## 2020-07-09 RX ADMIN — Medication 1 MILLIGRAM(S): at 17:18

## 2020-07-09 RX ADMIN — HALOPERIDOL DECANOATE 5 MILLIGRAM(S): 100 INJECTION INTRAMUSCULAR at 20:47

## 2020-07-09 RX ADMIN — Medication 1 MILLIGRAM(S): at 20:45

## 2020-07-09 NOTE — PROGRESS NOTE BEHAVIORAL HEALTH - NSBHFUPINTERVALCCFT_PSY_A_CORE
pt continues to express concerns in evening about bad Yarsani thoughts in  his head.  He doesn't know where they come from, and is concerned about some form of punishment. Denies hallucinations and s/h ideation.      Will start standing haldol, as pt notes consistent improvement as he takes this med on prn basis

## 2020-07-10 PROCEDURE — 99231 SBSQ HOSP IP/OBS SF/LOW 25: CPT

## 2020-07-10 RX ADMIN — Medication 0.5 MILLIGRAM(S): at 08:04

## 2020-07-10 RX ADMIN — HALOPERIDOL DECANOATE 5 MILLIGRAM(S): 100 INJECTION INTRAMUSCULAR at 22:14

## 2020-07-10 RX ADMIN — RISPERIDONE 3 MILLIGRAM(S): 4 TABLET ORAL at 20:07

## 2020-07-10 RX ADMIN — Medication 1 MILLIGRAM(S): at 22:14

## 2020-07-10 RX ADMIN — HALOPERIDOL DECANOATE 5 MILLIGRAM(S): 100 INJECTION INTRAMUSCULAR at 20:07

## 2020-07-10 RX ADMIN — Medication 0.5 MILLIGRAM(S): at 20:07

## 2020-07-10 RX ADMIN — Medication 1 MILLIGRAM(S): at 17:03

## 2020-07-10 RX ADMIN — Medication 50 MILLIGRAM(S): at 22:14

## 2020-07-10 RX ADMIN — RISPERIDONE 3 MILLIGRAM(S): 4 TABLET ORAL at 08:04

## 2020-07-10 NOTE — PROGRESS NOTE BEHAVIORAL HEALTH - NSBHFUPINTERVALCCFT_PSY_A_CORE
Pt's mood is pleasant and he is out of his room more. He is less religiously preoccupied and says he feels better. No s/h ideation or overt psychosis evident

## 2020-07-11 RX ADMIN — RISPERIDONE 3 MILLIGRAM(S): 4 TABLET ORAL at 09:52

## 2020-07-11 RX ADMIN — Medication 1 MILLIGRAM(S): at 17:02

## 2020-07-11 RX ADMIN — Medication 0.5 MILLIGRAM(S): at 09:52

## 2020-07-11 RX ADMIN — Medication 0.5 MILLIGRAM(S): at 20:24

## 2020-07-11 RX ADMIN — HALOPERIDOL DECANOATE 5 MILLIGRAM(S): 100 INJECTION INTRAMUSCULAR at 20:24

## 2020-07-11 RX ADMIN — RISPERIDONE 3 MILLIGRAM(S): 4 TABLET ORAL at 20:24

## 2020-07-12 RX ADMIN — Medication 0.5 MILLIGRAM(S): at 08:10

## 2020-07-12 RX ADMIN — Medication 2 MILLIGRAM(S): at 23:09

## 2020-07-12 RX ADMIN — HALOPERIDOL DECANOATE 5 MILLIGRAM(S): 100 INJECTION INTRAMUSCULAR at 23:08

## 2020-07-12 RX ADMIN — Medication 50 MILLIGRAM(S): at 00:09

## 2020-07-12 RX ADMIN — HALOPERIDOL DECANOATE 5 MILLIGRAM(S): 100 INJECTION INTRAMUSCULAR at 03:08

## 2020-07-12 RX ADMIN — Medication 0.5 MILLIGRAM(S): at 20:42

## 2020-07-13 PROCEDURE — 99231 SBSQ HOSP IP/OBS SF/LOW 25: CPT

## 2020-07-13 RX ADMIN — Medication 0.5 MILLIGRAM(S): at 20:32

## 2020-07-13 RX ADMIN — HALOPERIDOL DECANOATE 5 MILLIGRAM(S): 100 INJECTION INTRAMUSCULAR at 23:15

## 2020-07-13 RX ADMIN — Medication 0.5 MILLIGRAM(S): at 08:44

## 2020-07-13 RX ADMIN — Medication 50 MILLIGRAM(S): at 01:45

## 2020-07-13 NOTE — PROGRESS NOTE BEHAVIORAL HEALTH - NSBHFUPINTERVALCCFT_PSY_A_CORE
Mood is neutral; no s/h ideation or behavior issues. No Episcopal preoccupations evident today.  Prns discussed with pt; will adjust HS meds to allow to pt fall asleep easier.  Spoke with patient's father ([phone]), who confirmed that pt was sounding better, and that he can go there on d/c.

## 2020-07-14 VITALS
TEMPERATURE: 98 F | HEART RATE: 77 BPM | DIASTOLIC BLOOD PRESSURE: 74 MMHG | SYSTOLIC BLOOD PRESSURE: 125 MMHG | RESPIRATION RATE: 18 BRPM

## 2020-07-14 PROCEDURE — 99231 SBSQ HOSP IP/OBS SF/LOW 25: CPT

## 2020-07-14 RX ORDER — TRAZODONE HCL 50 MG
50 TABLET ORAL ONCE
Refills: 0 | Status: COMPLETED | OUTPATIENT
Start: 2020-07-14 | End: 2020-07-14

## 2020-07-14 RX ADMIN — Medication 0.5 MILLIGRAM(S): at 08:03

## 2020-07-14 RX ADMIN — Medication 2 MILLIGRAM(S): at 15:07

## 2020-07-14 RX ADMIN — HALOPERIDOL DECANOATE 5 MILLIGRAM(S): 100 INJECTION INTRAMUSCULAR at 22:47

## 2020-07-14 RX ADMIN — Medication 50 MILLIGRAM(S): at 01:02

## 2020-07-14 RX ADMIN — Medication 0.5 MILLIGRAM(S): at 20:08

## 2020-07-14 RX ADMIN — HALOPERIDOL DECANOATE 5 MILLIGRAM(S): 100 INJECTION INTRAMUSCULAR at 05:18

## 2020-07-14 NOTE — PROGRESS NOTE BEHAVIORAL HEALTH - NSBHPTASSESSDT_PSY_A_CORE
01-Jul-2020 14:09
06-Jul-2020 16:07
07-Jul-2020 15:03
09-Jul-2020 14:03
13-Jul-2020 15:01
24-Jun-2020 15:44
25-Jun-2020 15:17
26-Jun-2020 15:30
27-Jun-2020 15:50
28-Jun-2020 14:05
29-Jun-2020 10:53
30-Jun-2020 15:52
10-Jul-2020 14:19
08-Jul-2020 15:14
14-Jul-2020 15:47
02-Jul-2020 14:56

## 2020-07-14 NOTE — PROGRESS NOTE BEHAVIORAL HEALTH - NSBHLEGALSTATUS_PSY_A_CORE
9.27 (2PC)
9.39 (Emergency)
9.27 (2PC)
9.39 (Emergency)
9.27 (2PC)
9.27 (2PC)
9.39 (Emergency)

## 2020-07-14 NOTE — PROGRESS NOTE ADULT - SUBJECTIVE AND OBJECTIVE BOX
pt stable alert in NAD  no new complaints    DEPRESSION  ^DEPRESSION  MEWS Score  Schizophrenia  Schizophrenia  Schizoaffective disorder, bipolar type  Psychosis, unspecified psychosis type  PSYCH EVAL    HEALTH ISSUES - PROBLEM Dx:  Schizoaffective disorder, bipolar type  Psychosis, unspecified psychosis type        PAST MEDICAL & SURGICAL HISTORY:  Schizophrenia    No Known Allergies      FAMILY HISTORY:      benztropine 0.5 milliGRAM(s) Oral two times a day  haloperidol     Tablet 5 milliGRAM(s) Oral every 6 hours PRN  haloperidol     Tablet 5 milliGRAM(s) Oral <User Schedule>  hydrOXYzine hydrochloride 25 milliGRAM(s) Oral at bedtime  LORazepam     Tablet 1 milliGRAM(s) Oral <User Schedule>  melatonin. 3 milliGRAM(s) Oral at bedtime  nicotine  Polacrilex Gum 2 milliGRAM(s) Oral every 2 hours PRN  risperiDONE   Tablet 3 milliGRAM(s) Oral every 12 hours      T(C): 36.2 (07-14-20 @ 06:04), Max: 37.1 (07-13-20 @ 16:30)  HR: 76 (07-14-20 @ 06:04) (76 - 82)  BP: 133/64 (07-14-20 @ 06:04) (107/57 - 133/64)  RR: 16 (07-14-20 @ 06:04) (16 - 18)  SpO2: --    PE;  general:  no acute cahnges in nad    Lungs:    Heart:    EXT:    Neuro:  aelrt nod eficits                          CAPILLARY BLOOD GLUCOSE
pt stable alert in NAD  no new complaints    DEPRESSION  ^DEPRESSION  MEWS Score  Schizophrenia  Schizophrenia  Schizoaffective disorder, bipolar type  Psychosis, unspecified psychosis type  PSYCH EVAL    HEALTH ISSUES - PROBLEM Dx:  Schizoaffective disorder, bipolar type  Psychosis, unspecified psychosis type        PAST MEDICAL & SURGICAL HISTORY:  Schizophrenia    No Known Allergies      FAMILY HISTORY:      benztropine 0.5 milliGRAM(s) Oral two times a day  haloperidol     Tablet 5 milliGRAM(s) Oral every 6 hours PRN  hydrOXYzine hydrochloride 50 milliGRAM(s) Oral at bedtime PRN  LORazepam     Tablet 1 milliGRAM(s) Oral every 6 hours PRN  nicotine  Polacrilex Gum 2 milliGRAM(s) Oral every 2 hours PRN  risperiDONE   Tablet 2 milliGRAM(s) Oral every 12 hours      T(C): 36.1 (07-07-20 @ 15:56), Max: 36.1 (07-07-20 @ 15:56)  HR: 67 (07-07-20 @ 15:56) (67 - 67)  BP: 107/57 (07-07-20 @ 15:56) (107/57 - 107/57)  RR: 16 (07-07-20 @ 15:56) (16 - 16)  SpO2: --    PE;  general:  no changes noted in nad    Lungs:    Heart:    EXT:    Neuro:  alert no deficits                          CAPILLARY BLOOD GLUCOSE
pt stable alert in NAD  no new complaints    DEPRESSION  ^DEPRESSION  MEWS Score  Schizophrenia  Schizophrenia  Schizoaffective disorder, bipolar type  Psychosis, unspecified psychosis type  PSYCH EVAL    HEALTH ISSUES - PROBLEM Dx:  Schizoaffective disorder, bipolar type  Psychosis, unspecified psychosis type        PAST MEDICAL & SURGICAL HISTORY:  Schizophrenia    No Known Allergies      FAMILY HISTORY:      benztropine 0.5 milliGRAM(s) Oral two times a day  haloperidol     Tablet 5 milliGRAM(s) Oral every 6 hours PRN  hydrOXYzine hydrochloride 50 milliGRAM(s) Oral at bedtime PRN  LORazepam     Tablet 2 milliGRAM(s) Oral every 6 hours PRN  risperiDONE   Tablet 1 milliGRAM(s) Oral two times a day      T(C): --  HR: --  BP: --  RR: --  SpO2: --    PE;  general:  stable no acute cahnges in nad    Lungs:    Heart:    EXT:    Neuro:  aelrt no deficits      16.3  46.1  10.02  17  1.0  4.8  106                      CAPILLARY BLOOD GLUCOSE
pt stable alert in NAD  no new complaints    DEPRESSION  ^DEPRESSION  MEWS Score  Schizophrenia  Schizophrenia  Schizoaffective disorder, bipolar type  Psychosis, unspecified psychosis type  PSYCH EVAL    HEALTH ISSUES - PROBLEM Dx:  Schizoaffective disorder, bipolar type  Psychosis, unspecified psychosis type        PAST MEDICAL & SURGICAL HISTORY:  Schizophrenia    No Known Allergies      FAMILY HISTORY:      benztropine 0.5 milliGRAM(s) Oral two times a day  haloperidol     Tablet 5 milliGRAM(s) Oral every 6 hours PRN  hydrOXYzine hydrochloride 50 milliGRAM(s) Oral at bedtime PRN  LORazepam     Tablet 2 milliGRAM(s) Oral every 6 hours PRN  risperiDONE   Tablet 1 milliGRAM(s) Oral two times a day      T(C): --  HR: --  BP: --  RR: --  SpO2: --    PE;  general: stable no acute changes nteod in nad    Lungs:    Heart:    EXT:    Neuro:  aelrt no deficits                          CAPILLARY BLOOD GLUCOSE
pt stable alert in NAD  no new complaints    DEPRESSION  ^DEPRESSION  MEWS Score  Schizophrenia  Schizophrenia  Schizoaffective disorder, bipolar type  Psychosis, unspecified psychosis type  PSYCH EVAL    HEALTH ISSUES - PROBLEM Dx:  Schizoaffective disorder, bipolar type  Psychosis, unspecified psychosis type        PAST MEDICAL & SURGICAL HISTORY:  Schizophrenia    No Known Allergies      FAMILY HISTORY:      benztropine 0.5 milliGRAM(s) Oral two times a day  haloperidol     Tablet 5 milliGRAM(s) Oral every 6 hours PRN  hydrOXYzine hydrochloride 50 milliGRAM(s) Oral at bedtime PRN  LORazepam     Tablet 2 milliGRAM(s) Oral every 6 hours PRN  risperiDONE   Tablet 2 milliGRAM(s) Oral every 12 hours      T(C): 36.8 (06-30-20 @ 19:18), Max: 36.8 (06-30-20 @ 19:18)  HR: 93 (06-30-20 @ 19:18) (93 - 93)  BP: 92/66 (06-30-20 @ 19:18) (92/66 - 92/66)  RR: 16 (06-30-20 @ 19:18) (16 - 16)  SpO2: --    PE;  general:  stable no acute changes snteod    Lungs:    Heart:    EXT:    Neuro:  aelrt no deficits                          CAPILLARY BLOOD GLUCOSE
pt stable alert in NAD  no new complaints    DEPRESSION  ^DEPRESSION  MEWS Score  Schizophrenia  Schizophrenia  Schizoaffective disorder, bipolar type  Psychosis, unspecified psychosis type  PSYCH EVAL    HEALTH ISSUES - PROBLEM Dx:  Schizoaffective disorder, bipolar type  Psychosis, unspecified psychosis type        PAST MEDICAL & SURGICAL HISTORY:  Schizophrenia    No Known Allergies      FAMILY HISTORY:      benztropine 0.5 milliGRAM(s) Oral two times a day  haloperidol     Tablet 5 milliGRAM(s) Oral every 6 hours PRN  hydrOXYzine hydrochloride 50 milliGRAM(s) Oral at bedtime PRN  LORazepam     Tablet 1 milliGRAM(s) Oral every 6 hours PRN  nicotine  Polacrilex Gum 2 milliGRAM(s) Oral every 2 hours PRN  risperiDONE   Tablet 2 milliGRAM(s) Oral every 12 hours      T(C): 35.9 (07-05-20 @ 17:27), Max: 35.9 (07-05-20 @ 17:27)  HR: 80 (07-05-20 @ 17:27) (80 - 80)  BP: 103/66 (07-05-20 @ 17:27) (103/66 - 103/66)  RR: 16 (07-05-20 @ 17:27) (16 - 16)  SpO2: --    PE;  general:  no changse ntoed innad    Lungs:    Heart:    EXT:    Neuro:  alert nod eficits                          CAPILLARY BLOOD GLUCOSE

## 2020-07-14 NOTE — PROGRESS NOTE BEHAVIORAL HEALTH - NSBHATTESTSEENBY_PSY_A_CORE
attending Psychiatrist without NP/Trainee
Attending Psychiatrist supervising NP/Trainee, meeting pt...

## 2020-07-14 NOTE — PROGRESS NOTE BEHAVIORAL HEALTH - NSBHADMITIPOBSFT_PSY_A_CORE
as clinically indicated
as indicated
as clinically indicated

## 2020-07-14 NOTE — PROGRESS NOTE BEHAVIORAL HEALTH - GAIT / STATION
Normal gait / station
Other
Normal gait / station
Other
Normal gait / station

## 2020-07-14 NOTE — PROGRESS NOTE BEHAVIORAL HEALTH - THOUGHT PROCESS
Linear
Thought blocking/Disorganized
Linear
Linear
Thought blocking/Disorganized
Linear
Disorganized
Disorganized
Disorganized/Thought blocking
Linear

## 2020-07-14 NOTE — PROGRESS NOTE BEHAVIORAL HEALTH - AFFECT QUALITY
Euthymic

## 2020-07-14 NOTE — PROGRESS NOTE BEHAVIORAL HEALTH - MUSCLE TONE / STRENGTH
Normal muscle tone/strength
Other
Normal muscle tone/strength
Other
Normal muscle tone/strength

## 2020-07-14 NOTE — PROGRESS NOTE ADULT - PROBLEM SELECTOR PLAN 1
continue present treatment as per psych plan as reviewed  Medically stable with no new changes in treatment  will continue to monitor medical status while being treated on psych

## 2020-07-14 NOTE — PROGRESS NOTE BEHAVIORAL HEALTH - NSBHFUPINTERVALCCFT_PSY_A_CORE
No s/h ideation. Pt remains with Adventist concerns, but none appear psychotic, and pt is easily redirected.  No hallucinations.  Will continue meds and proceed with d/c planning

## 2020-07-14 NOTE — PROGRESS NOTE BEHAVIORAL HEALTH - NSBHADMITDANGERSELF_PSY_A_CORE
unable to care for self

## 2020-07-15 PROCEDURE — 99238 HOSP IP/OBS DSCHRG MGMT 30/<: CPT

## 2020-07-15 RX ORDER — HALOPERIDOL DECANOATE 100 MG/ML
1 INJECTION INTRAMUSCULAR
Qty: 14 | Refills: 0
Start: 2020-07-15

## 2020-07-15 RX ORDER — RISPERIDONE 4 MG/1
25 TABLET ORAL
Qty: 0 | Refills: 0 | DISCHARGE
Start: 2020-07-15

## 2020-07-15 RX ORDER — BENZTROPINE MESYLATE 1 MG
1 TABLET ORAL
Qty: 30 | Refills: 0
Start: 2020-07-15

## 2020-07-15 RX ORDER — RISPERIDONE 4 MG/1
1 TABLET ORAL
Qty: 30 | Refills: 0
Start: 2020-07-15

## 2020-07-15 RX ORDER — HYDROXYZINE HCL 10 MG
1 TABLET ORAL
Qty: 14 | Refills: 0
Start: 2020-07-15

## 2020-07-15 RX ORDER — LANOLIN ALCOHOL/MO/W.PET/CERES
1 CREAM (GRAM) TOPICAL
Qty: 14 | Refills: 0
Start: 2020-07-15

## 2020-07-15 RX ADMIN — Medication 0.5 MILLIGRAM(S): at 08:03

## 2020-07-15 NOTE — CHART NOTE - NSCHARTNOTEFT_GEN_A_CORE
Mr. Hadley remains on the unit for continued treatment, safety and observation. Treatment team met with patient to discuss treatment plan, medications and discharge plan. He was pleasant and open to interview. He provided brief answers to most questions asked and asked about his discharge. There were no evident Yazidi preoccupations. He denies and shows no evidence for suicidal or homicidal ideation. He is in good behavioral control. He is more visible on the unit and is often seen taking walks in the hallways. He does not attend unit groups although the benefits of groups therapy were discussed.      will continue to meet with Mr. Hadley one on one and with treatment team daily. Once stable for d/c, patient will return home and resume services with his ACT Team (ACT team has not yet been identified).  At this time, patient is not yet psychiatrically stable for discharge.     Mental Status Exam:    Mood – Normal/ Euthymic  Sleep - Good  Appetite - Good  ADLs - Good  Thought Process – Linear  Observation – [id number]    At this time, patient is not psychiatrically stable for discharge.
Pt is scheduled for discharge on this date.  He is presenting at baseline at this time.  Pt. denies any suicidal or homicidal ideations or any A/V hallucinations.  He has largely remained isolative to his room throughout treatment, however, he was compliant with medication.  Pt. will be returning to his father's home.  He will follow up with his ACT team, [facility] [phone].  Writer confirmed with Joe on this date.  Pt. presents as stable for discharge on this date.
SW spoke with Joe,  for [facility] the [facility] team who is assigned to the patient. SW received collateral and they will eb available for when the pt is ready for D/C. Contact information is as follows:  [facility]  P. [phone]  C. [phone]  F. [phone]
Social Work Admit Note:    Patient is 30 years of age male who was admitted for evaluation of psychosis.  At time of assessment in the emergency department patient endorsed “I am having intrusive thoughts.”  He was unable to be still during interview and appeared restless.  Patient verbalized having thoughts of being judged in the afterlife.  Symptoms endorsed by patient included poor appetite, poor concentration, and low energy.  Audio hallucinations denied though he appeared to be responding to internal stimuli.  No suicidal thoughts were endorsed.  Patient denies any history of suicide attempts.      In the community patient resides with family in a private residence in [place].  He is single marital status.  Patient is unemployed.  He reported history of treatment for schizophrenia with multiple prior inpatient psychiatric admissions.  His most recent inpatient admission was at [facility].  Alcohol and substance use denied.  Patient informed he was seen by an ACT Team but was not able to recall any information for contact.      Patient’s friend August was contacted [phone] who provided contact information for patient’s father Sparkle [phone] and patient’s sister Diya [phone].      Sexual History – patient identifies as heterosexual. 	    Family relationships and history – Patient resides with family who are his primary social supports    Leisure Activity Assessment – spending time with his family and friends    Community Supports – No known attendance in any self- help groups or other organizations.     Employment – patient is not employed at this time.     Substance Use Assessment – use of alcohol or other substances denied.       History of suicidality or self- injurious behaviors – no known history     Significant Loses – None identified.      Life Goals – patient verbalized goal of improved mental health     will continue to meet with patient 1:1 and with treatment team daily.  Discharge plan is for continued mental health treatment in outpatient setting.      Please refer to Social Work Psychosocial for additional information.
Social Work Note    Treatment team met with patient to discuss treatment plan, medications and discharge plan. At time of assessment patient continues to present as religiously preoccupied.  Patient also remains disorganized and endorses continued intrusive “bad thoughts”.  Patient denies SI/HI.  He is in good behavioral control.  He is pleasant but mostly isolative to his room or paces around the unit.  He has not been socializing with peers or attending unit groups.      Once stable for discharge, patient will return to his private home where he lives with family.  Pt.'s family has offered minimal collateral information.  Pt. reportedly has an ACT team but does not know where.  Writer confirmed with the [facility] ACT team that they are NOT his providers.  Attempts will continue to find pt.'s ACT team.  Pt is not psychiatrically stable for discharge at this time.    Mental Status Exam:    Mood – Neutral   Sleep - Good  Appetite - Good  ADLs - Fair  Thought Process – Delusional    Observation – [id number]    No barriers to discharge identified at this time.     At this time patient is not psychiatrically stable for discharge
Social Work Note:    Patient remains on the unit for continued treatment, safety and observation. Earlier today patient did not wish to participate in a treatment team meeting.  Patient remains religiously preoccupied.  Responses to questions on interview have been minimal.  No behavioral outbursts.  During the day patient has been observed to be visible on the unit.  He has declined to attend and participate in any groups that are offered.       will continue to meet with Mr. Hadley one on one and with treatment team daily. Once stable for d/c, patient will return home and resume services with his ACT Team.      At this time, patient is not yet psychiatrically stable for discharge.     Mental Status Exam:    Mood – Neutral    Sleep – Good    Appetite – Good    ADLs – Fair    Thought Process – Linear    Observation – [id number]    At this time, patient is not psychiatrically stable for discharge.
The treatment team met with pt. to review treatment plan, medications and discharge plan.  Pt. continues to present as disorganized and with thought blocking.  He minimally engages in dialogue with writer in spite of multiple attempts to engage him.  Pt. remains isolative to his room. He denies any suicidal or homicidal ideation or any A/V hallucinations. The benefits of engaging in unit activities was explained to pt.  Pt. was encouraged to engage in group counseling session and other activities on the unit.  Pt. denies any suicidal or homicidal ideation or any A/V hallucinations.  He continues to refuse to provide collateral contact for family.  Pt. states he is able to return home upon discharge. He also reports having an ACT team, however, he is unable to provide any information regarding his ACT team.  The treatment team will continue to work with pt. to obtain collateral information so he can be safely discharged.    Mental Status Exam:    Mood-  Euthymic    Sleep-  Normal    Appetite-  Normal    ADL's-  Fair    Thought Process-  Disorganized/Thought Blocking    Observation-  Routine    Pt. is not psychiatrically stable for discharge at this time.
The treatment team met with pt. to review treatment plan, medications and discharge plan.  Pt. is presenting as less disorganized and religiously preoccupied.  He still refuses to engage in much diaglogue with writer, however, in spite of attempts to engage pt.  Writer encouraged pt. to decrease isolation to his room which he has been doing.  Writer reviewed the benefits of doing so.  Pt. remains compliant with his medication and is not a behavioral issues on the unit.  He denies any suicidal or homicidal ideation or any A/V hallucinations.  Upon discharge, pt. will return to his father's home.  He will be referred to [facility] for continued mental health services.  Pt. is not psychiatrically stable for discharge at this time.
Will try dose of trazodone for sleep
The treatment team met with pt. to review treatment plan, medications and discharge plan.  Pt. continues to present as disorganized and psychotic.  Writer attempted to engage pt. but was unsuccessful due to pt.'s paranoia, psychosis and unwillingness to engage.  He continues to report hearing voices but refuses to disclose what they are saying.  Pt. remains mostly isolative to his room. Writer encouraged pt. to engage in unit activities as he is comfortable.  He was also encouraged to alert staff if the hallucinations increase or become bothersome.      Once stable for discharge, pt. will return to his private home where he lives with family.  Pt.'s family has offered minimal collateral information.  Pt. reportedly has an ACT team but does not know where.  Writer confirmed with the [facility] ACT team that they are NOT his providers.  Attempts will continue to find pt.'s ACT team.  Pt is not psychiatrically stable for discharge at this time.

## 2020-07-15 NOTE — DISCHARGE NOTE BEHAVIORAL HEALTH - NSBHDCSWCOMMENTSFT_PSY_A_CORE
Discharge Information faxed to the next level of care-[facility] [phone] on 7/15/20 at 2 pm

## 2020-07-15 NOTE — DISCHARGE NOTE BEHAVIORAL HEALTH - NSBHDCLABSFT_PSY_A_CORE
pt can f/u with PMD to obtain lipid profile. pt did not want further blood tests after admission despite encouragement

## 2020-07-15 NOTE — DISCHARGE NOTE BEHAVIORAL HEALTH - HPI (INCLUDE ILLNESS QUALITY, SEVERITY, DURATION, TIMING, CONTEXT, MODIFYING FACTORS, ASSOCIATED SIGNS AND SYMPTOMS)
31 y/o male with known psych history, dx Schizoaffective Disorder, admitted recently after being d/c'd from [facility] in [facility].  Pt was referred to ACT Team for f/u, but decompensated before they could see him; most likely due to non compliance.  In ED here the pt was bizarre, and internally preoccupied. There was no s/h ideation.  No substance abuse    On admission the patient was medically stable. He was started on Risperdal, and dose was titrated up , and Consta injection was given on 7/8 (25 mg). Pt continued to exhibit disorganization and reported that he felt better with low dose Haldol augmentation.  Pt did show improvement on this med, and pt was made aware of goal to titrate it down on after d/c.    Pt's isolative behavior improved, and he remained free of hallucinations and s/h ideation.  Pt continued to exhibit Advent preoccupations, but they were not of a psychotic nature, and their severity diminished over the course of treatment.    Pt's parents were contacted with consent; pt's father agreed that pt had improved and that he wanted him back home.      He was d/c'd to home in improved state, with ACT TEAM f/u on d/c

## 2020-07-15 NOTE — DISCHARGE NOTE BEHAVIORAL HEALTH - FAMILY HISTORY OF PSYCHIATRIC ILLNESS
unemployed; pt's parents are . Pt currently lives with father on [place], but spends some time in [facility] with mother

## 2020-07-15 NOTE — CHART NOTE - NSCHARTNOTESELECT_GEN_ALL_CORE
Event Note/Social Work Note
Event Note
Event Note/Social Work Discharge Note
Social Work Note/Event Note

## 2020-07-15 NOTE — DISCHARGE NOTE BEHAVIORAL HEALTH - NSBHPSYCHMEDTAPERFT_PSY_A_CORE
pt skylar be followed as outpatient; if clinical sx allow, haldol dose will be cut in half in 2 weeks, and then d/c'd after the following 2 weeks

## 2020-07-15 NOTE — DISCHARGE NOTE BEHAVIORAL HEALTH - MEDICATION SUMMARY - MEDICATIONS TO TAKE
I will START or STAY ON the medications listed below when I get home from the hospital:    LORazepam 1 mg oral tablet  -- 1 tab(s) by mouth once a day (at bedtime) until told otherwise by MD MDD:1  -- Indication: For anxiety    benztropine 0.5 mg oral tablet  -- 1 tab(s) by mouth 2 times a day until stopped by MD  -- Indication: For Side effect prevention    haloperidol 5 mg oral tablet  -- 1 tab(s) by mouth once a day  at dinner time until told otherwise by MD   -- Indication: For Schizoaffective disorder, bipolar type    risperiDONE 3 mg oral tablet  -- 1 tab(s) by mouth every 12 hours until told otherwise by MD  -- Indication: For Schizoaffective disorder, bipolar type    risperiDONE 25 mg/2 weeks intramuscular injection, extended release  -- 25 milligram(s) intramuscular every 2 weeks until told otherwise by MD.  Last dose 7/8; next dose 7/22  -- Indication: For Schizoaffective disorder, bipolar type    hydrOXYzine hydrochloride 25 mg oral tablet  -- 1 tab(s) by mouth once a day (at bedtime) until told otherwiae by MD  -- Indication: For anxiety/insomnia    melatonin 3 mg oral tablet  -- 1 tab(s) by mouth once a day (at bedtime) until told otherwise by MD  -- Indication: For insomnia

## 2020-07-17 ENCOUNTER — EMERGENCY (EMERGENCY)
Facility: HOSPITAL | Age: 30
LOS: 0 days | Discharge: HOME | End: 2020-07-17
Attending: EMERGENCY MEDICINE | Admitting: EMERGENCY MEDICINE
Payer: MEDICAID

## 2020-07-17 VITALS
RESPIRATION RATE: 18 BRPM | TEMPERATURE: 98 F | WEIGHT: 141.1 LBS | DIASTOLIC BLOOD PRESSURE: 93 MMHG | SYSTOLIC BLOOD PRESSURE: 148 MMHG | OXYGEN SATURATION: 96 % | HEIGHT: 65 IN | HEART RATE: 103 BPM

## 2020-07-17 DIAGNOSIS — F29 UNSPECIFIED PSYCHOSIS NOT DUE TO A SUBSTANCE OR KNOWN PHYSIOLOGICAL CONDITION: ICD-10-CM

## 2020-07-17 DIAGNOSIS — R42 DIZZINESS AND GIDDINESS: ICD-10-CM

## 2020-07-17 DIAGNOSIS — R63.8 OTHER SYMPTOMS AND SIGNS CONCERNING FOOD AND FLUID INTAKE: ICD-10-CM

## 2020-07-17 DIAGNOSIS — G47.9 SLEEP DISORDER, UNSPECIFIED: ICD-10-CM

## 2020-07-17 DIAGNOSIS — F17.210 NICOTINE DEPENDENCE, CIGARETTES, UNCOMPLICATED: ICD-10-CM

## 2020-07-17 DIAGNOSIS — F25.0 SCHIZOAFFECTIVE DISORDER, BIPOLAR TYPE: ICD-10-CM

## 2020-07-17 DIAGNOSIS — Z79.899 OTHER LONG TERM (CURRENT) DRUG THERAPY: ICD-10-CM

## 2020-07-17 DIAGNOSIS — Z79.891 LONG TERM (CURRENT) USE OF OPIATE ANALGESIC: ICD-10-CM

## 2020-07-17 DIAGNOSIS — F17.200 NICOTINE DEPENDENCE, UNSPECIFIED, UNCOMPLICATED: ICD-10-CM

## 2020-07-17 PROCEDURE — 70450 CT HEAD/BRAIN W/O DYE: CPT | Mod: 26

## 2020-07-17 PROCEDURE — 99284 EMERGENCY DEPT VISIT MOD MDM: CPT

## 2020-07-17 RX ORDER — MECLIZINE HCL 12.5 MG
25 TABLET ORAL ONCE
Refills: 0 | Status: COMPLETED | OUTPATIENT
Start: 2020-07-17 | End: 2020-07-17

## 2020-07-17 RX ADMIN — Medication 25 MILLIGRAM(S): at 01:51

## 2020-07-17 NOTE — ED ADULT TRIAGE NOTE - CHIEF COMPLAINT QUOTE
I don't feel well, my coordination is off - patient    EMS reports patient found on the street, states his head feels off. Patient has schizophrenic Hx, denies taking medications, still has this facility's patient wrist band on. Patient refusing to sit, pacing, steady gait

## 2020-07-17 NOTE — ED ADULT NURSE NOTE - NSIMPLEMENTINTERV_GEN_ALL_ED
Implemented All Universal Safety Interventions:  [place] to call system. Call bell, personal items and telephone within reach. Instruct patient to call for assistance. Room bathroom lighting operational. Non-slip footwear when patient is off stretcher. Physically safe environment: no spills, clutter or unnecessary equipment. Stretcher in lowest position, wheels locked, appropriate side rails in place.

## 2020-07-17 NOTE — ED ADULT NURSE NOTE - OBJECTIVE STATEMENT
pt presents to ed with c/o dizziness for 2 days states he feels the room is spinning. pt denies ha, neck pain, cp, sob, fever, chills n/v/d, urinary sxs.

## 2020-07-17 NOTE — ED PROVIDER NOTE - PATIENT PORTAL LINK FT
You can access the FollowMyHealth Patient Portal offered by [facility] by registering at the following website: [email or web address]. By joining [email or web address]’s FollowMyHealth portal, you will also be able to view your health information using other applications (apps) compatible with our system.

## 2020-07-17 NOTE — ED PROVIDER NOTE - OBJECTIVE STATEMENT
30yM pmhx schizoaffective disorder c/o dizziness x3mos, worsening today; associated w/ decreased PO intake; states he has been "feeling off." Pt recently discharged from [facility] w/ psych evaluation, states he has not been taking his medication because it makes him feel weird. Pt denies fever/chills, n/v/d, chest pain, SOB.

## 2020-07-17 NOTE — ED PROVIDER NOTE - CARE PROVIDER_API CALL
Octavio Swan A  NEUROLOGY  [address] [place] [id number]  Phone: [phone]  Fax: [phone]  Follow Up Time: 1-3 Days

## 2020-07-17 NOTE — ED PROVIDER NOTE - PHYSICAL EXAMINATION
Vital Signs: Reviewed  GEN: alert, NAD, speaks full sentences, answers questions appropriately, unkempt appearance, pacing around ED  HEAD:  normocephalic, atraumatic  EYES:  PERRLA; conjunctivae without injection, drainage or discharge  ENMT:  nasal mucosa moist; mouth moist without ulcerations or lesions; throat moist without erythema, exudate, ulcerations or lesions  NECK:  supple  CARDIAC:  regular rate, normal S1 and S2, no murmurs  RESP:  respiratory rate and effort appear normal for age; lungs are clear to auscultation bilaterally; no rales or wheezes  ABDOMEN:  soft, nontender, nondistended  MUSCULOSKELETAL/NEURO: CNII-XII intact, no dysmetria, no dysdiadokochinesis, strength 5/5 b/l UE LE, gait normal, Rhomberg neg; normal movement, normal tone  SKIN:  normal skin color for age and race, well-perfused; warm and dry

## 2020-07-17 NOTE — ED PROVIDER NOTE - PROGRESS NOTE DETAILS
AG: pt w/ known psych hx endorsing not taking medications, perseverating on "feeling off" wanting "soup to nuts" workup. Informed of results of w/u, agreeable to d/c.

## 2020-07-17 NOTE — ED PROVIDER NOTE - ATTENDING CONTRIBUTION TO CARE
pt co dizziness for 2 days. feels the room is spinning. no ha, neck pain, cp, sob, fever, chills. no n, v, numbness or weakness. recent [facility] admission. denies HI SI halluc. lives with father. followed by ACT team.   pt is in nad, well appearing, blunted affect, but cooperative and pleasant.  he is very anxious about the dizziness.  neck sup, no bruits or thrills, cta, rrr, ab soft, nt nd.  Alert and oriented, face symmetric and speech fluent. Strength 5/5 x 4 ext and symmetric, nml gross motor movement, nml RRM/TONA/FTN, nml gait. No focal deficits noted.  ct head, fs, antivert.

## 2020-07-17 NOTE — ED PROVIDER NOTE - NSFOLLOWUPINSTRUCTIONS_ED_ALL_ED_FT
Please follow up with your primary care doctor in 1-3 days for further evaluation. Return to the emergency department sooner for any new or worsening symptoms.    Please follow up with the neurologist listed below for further evaluation.         Dizziness    Dizziness is a common problem. It is a feeling of unsteadiness or light-headedness. You may feel like you are about to faint. This condition can be caused by a number of things, including medicines, dehydration, or illness. Drink enough fluid to keep your urine clear or pale yellow. Do not drink alcohol and limit your caffeine and salt intake. Avoid quick movement.  Rise slowly from chairs and steady yourself until you feel okay. In the morning, first sit up on the side of the bed.    SEEK IMMEDIATE MEDICAL CARE IF YOU HAVE THE FOLLOWING SYMPTOMS: vomiting, changes in your vision or speech, weakness in your arms or legs, trouble speaking or swallowing, chest pain, abdominal pain, shortness of breath, sweating, bleeding, headache, neck pain, or fever.

## 2020-07-17 NOTE — ED PROVIDER NOTE - NS ED ROS FT
Review of Systems:  	•	CONSTITUTIONAL - no fever, no diaphoresis  	•	SKIN - no rash, no lesions  	•	HEMATOLOGIC - no bleeding, no bruising  	•	EYES - no discharge, no injection  	•	ENT - no sore throat, no runny nose  	•	RESPIRATORY - no shortness of breath, no cough  	•	CARDIAC - no chest pain, no palpitations  	•	GI - no abd pain, no nausea, no vomiting, no diarrhea  	•	GENITO-URINARY - no dysuria, no hematuria  	•	MUSCULOSKELETAL - no joint pain, no muscle aches  	•	NEUROLOGIC - +dizziness, no headache  	•	PSYCH - non suicidal, non homicidal

## 2020-07-18 ENCOUNTER — INPATIENT (INPATIENT)
Facility: HOSPITAL | Age: 30
LOS: 43 days | Discharge: HOME | End: 2020-08-31
Attending: PSYCHIATRY & NEUROLOGY | Admitting: PSYCHIATRY & NEUROLOGY
Payer: MEDICAID

## 2020-07-18 VITALS
SYSTOLIC BLOOD PRESSURE: 134 MMHG | RESPIRATION RATE: 18 BRPM | HEART RATE: 110 BPM | HEIGHT: 65 IN | OXYGEN SATURATION: 96 % | DIASTOLIC BLOOD PRESSURE: 87 MMHG | TEMPERATURE: 99 F

## 2020-07-18 DIAGNOSIS — F25.9 SCHIZOAFFECTIVE DISORDER, UNSPECIFIED: ICD-10-CM

## 2020-07-18 PROBLEM — F20.9 SCHIZOPHRENIA, UNSPECIFIED: Chronic | Status: ACTIVE | Noted: 2020-06-23

## 2020-07-18 LAB
ALBUMIN SERPL ELPH-MCNC: 5.6 G/DL — HIGH (ref 3.5–5.2)
ALP SERPL-CCNC: 136 U/L — HIGH (ref 30–115)
ALT FLD-CCNC: 87 U/L — HIGH (ref 0–41)
ANION GAP SERPL CALC-SCNC: 17 MMOL/L — HIGH (ref 7–14)
APAP SERPL-MCNC: <5 UG/ML — LOW (ref 10–30)
AST SERPL-CCNC: 20 U/L — SIGNIFICANT CHANGE UP (ref 0–41)
BASOPHILS # BLD AUTO: 0.03 K/UL — SIGNIFICANT CHANGE UP (ref 0–0.2)
BASOPHILS NFR BLD AUTO: 0.3 % — SIGNIFICANT CHANGE UP (ref 0–1)
BILIRUB SERPL-MCNC: 0.9 MG/DL — SIGNIFICANT CHANGE UP (ref 0.2–1.2)
BUN SERPL-MCNC: 23 MG/DL — HIGH (ref 10–20)
CALCIUM SERPL-MCNC: 11.2 MG/DL — HIGH (ref 8.5–10.1)
CHLORIDE SERPL-SCNC: 96 MMOL/L — LOW (ref 98–110)
CO2 SERPL-SCNC: 22 MMOL/L — SIGNIFICANT CHANGE UP (ref 17–32)
CREAT SERPL-MCNC: 0.9 MG/DL — SIGNIFICANT CHANGE UP (ref 0.7–1.5)
EOSINOPHIL # BLD AUTO: 0 K/UL — SIGNIFICANT CHANGE UP (ref 0–0.7)
EOSINOPHIL NFR BLD AUTO: 0 % — SIGNIFICANT CHANGE UP (ref 0–8)
ETHANOL SERPL-MCNC: <10 MG/DL — SIGNIFICANT CHANGE UP
GLUCOSE SERPL-MCNC: 105 MG/DL — HIGH (ref 70–99)
HCT VFR BLD CALC: 48 % — SIGNIFICANT CHANGE UP (ref 42–52)
HGB BLD-MCNC: 16.7 G/DL — SIGNIFICANT CHANGE UP (ref 14–18)
IMM GRANULOCYTES NFR BLD AUTO: 0.2 % — SIGNIFICANT CHANGE UP (ref 0.1–0.3)
LYMPHOCYTES # BLD AUTO: 0.88 K/UL — LOW (ref 1.2–3.4)
LYMPHOCYTES # BLD AUTO: 9.1 % — LOW (ref 20.5–51.1)
MCHC RBC-ENTMCNC: 30.1 PG — SIGNIFICANT CHANGE UP (ref 27–31)
MCHC RBC-ENTMCNC: 34.8 G/DL — SIGNIFICANT CHANGE UP (ref 32–37)
MCV RBC AUTO: 86.5 FL — SIGNIFICANT CHANGE UP (ref 80–94)
MONOCYTES # BLD AUTO: 0.44 K/UL — SIGNIFICANT CHANGE UP (ref 0.1–0.6)
MONOCYTES NFR BLD AUTO: 4.5 % — SIGNIFICANT CHANGE UP (ref 1.7–9.3)
NEUTROPHILS # BLD AUTO: 8.35 K/UL — HIGH (ref 1.4–6.5)
NEUTROPHILS NFR BLD AUTO: 85.9 % — HIGH (ref 42.2–75.2)
NRBC # BLD: 0 /100 WBCS — SIGNIFICANT CHANGE UP (ref 0–0)
PLATELET # BLD AUTO: 276 K/UL — SIGNIFICANT CHANGE UP (ref 130–400)
POTASSIUM SERPL-MCNC: 4.6 MMOL/L — SIGNIFICANT CHANGE UP (ref 3.5–5)
POTASSIUM SERPL-SCNC: 4.6 MMOL/L — SIGNIFICANT CHANGE UP (ref 3.5–5)
PROT SERPL-MCNC: 8.4 G/DL — HIGH (ref 6–8)
RBC # BLD: 5.55 M/UL — SIGNIFICANT CHANGE UP (ref 4.7–6.1)
RBC # FLD: 11.9 % — SIGNIFICANT CHANGE UP (ref 11.5–14.5)
SALICYLATES SERPL-MCNC: <0.3 MG/DL — LOW (ref 4–30)
SARS-COV-2 RNA SPEC QL NAA+PROBE: SIGNIFICANT CHANGE UP
SODIUM SERPL-SCNC: 135 MMOL/L — SIGNIFICANT CHANGE UP (ref 135–146)
WBC # BLD: 9.72 K/UL — SIGNIFICANT CHANGE UP (ref 4.8–10.8)
WBC # FLD AUTO: 9.72 K/UL — SIGNIFICANT CHANGE UP (ref 4.8–10.8)

## 2020-07-18 PROCEDURE — 90792 PSYCH DIAG EVAL W/MED SRVCS: CPT

## 2020-07-18 PROCEDURE — 99285 EMERGENCY DEPT VISIT HI MDM: CPT

## 2020-07-18 PROCEDURE — 93010 ELECTROCARDIOGRAM REPORT: CPT

## 2020-07-18 RX ADMIN — Medication 2 MILLIGRAM(S): at 20:45

## 2020-07-18 NOTE — ED BEHAVIORAL HEALTH ASSESSMENT NOTE - SUICIDE RISK FACTORS
Hopelessness or despair/Psychotic disorder current/past/Recent onset of current/past psychiatric diagnosis/Mood Disorder current/past

## 2020-07-18 NOTE — ED BEHAVIORAL HEALTH ASSESSMENT NOTE - SUMMARY
Patient is 29yo M single, unemployed, domiciled with his father, PPH of Schizoaffective disorder, recently admitted to [facility] from 6/23/20-7/15/20 and at [facility] shortly before that (for bizarre behavior, psychosis), presented to the ED today with somatic complaints of "I can't walk" and dizziness. Psychiatry was consulted to evaluate patient for suicidal ideations (expressed to nursing staff). Patient is 29yo M single, unemployed, domiciled with his father, PPH of Schizoaffective disorder, recently admitted to [facility] from 6/23/20-7/15/20 and at [facility] shortly before that (for bizarre behavior, psychosis), presented to the ED today with somatic complaints of "I can't walk" and dizziness. Psychiatry was consulted to evaluate patient for suicidal ideations (expressed to nursing staff).    On evaluation, patient presents to the ED as acutely psychotic with worsening somatic delusions. In addition, he continues to suffer from Confucianist preoccupations and poor self-care. Altogether, his symptoms have been causing him severe distress to the point at which he has begun having suicidal ideations in the last week. Lastly, the patient continues to exhibit compulsive pacing, which most likely is a part of his psychosis, but akithisia should be ruled out as well given his recent OG initiation. Given the patient's presentation today, he is requesting voluntary admission to [facility] for more intensive treatment. Based on assessment, it is felt that the patient would benefit from an inpatient admission with consideration for application to a long-term psychiatric hospital given the frequency of [facility] admissions in the past year.      Recommendations:  - Admit to [facility] on 9.13 voluntary legal status  - In ED, Ativan 2mg IM as requested formulation by patient, one time dose.  - Ativan 2mg PO Q8 PRN anxiety  - Holding antipsychotics for now given some suspicion for akithisia; can be addressed further by [facility]PP team  - Labs ordered, including: CBC, CMP, TSH, Lipid profile, Hemoglobin A1C; EKG ordered as well  - NRT to be ordered on IPP  - COVID rapid test results pending

## 2020-07-18 NOTE — ED BEHAVIORAL HEALTH ASSESSMENT NOTE - CURRENT MEDICATION
From prior IP admission (7/15/20):  Risperidone 3mg PO Q12h, Risperidone 25mg/2wk (last dose 7/8; next dose 7/22)  Haldol 5mg PO at dinner time, Lorazepam 1mg PO at bedtime, Benztropine 0.5mg PO BID, Hydroxyzine hydrochloride 25mg PO at bedtime, Melatonin 3mg at bedtime

## 2020-07-18 NOTE — ED PROVIDER NOTE - PHYSICAL EXAMINATION
CONST: Well appearing in NAD  EYES: PERRL, EOMI, Sclera and conjunctiva clear.   ENT: No nasal discharge. Oropharynx normal appearing, no erythema or exudates. No abscess or swelling. Uvula midline. No temporal artery or mastoid tenderness.  NECK: Non-tender, no meningeal signs. normal ROM. supple   CARD: Normal S1 S2; Normal rate and rhythm  RESP: Equal BS B/L, No wheezes, rhonchi or rales. No distress  GI: Soft, non-tender, non-distended. no cva tenderness. normal BS  MS: Normal ROM in all extremities. No midline spinal tenderness. pulses 2 +. no calf tenderness or swelling  SKIN: Warm, dry, no acute rashes. Good turgor  NEURO: A&Ox3, No focal deficits. Strength 5/5 with no sensory deficits. Steady gait. Finger to nose intact. Negative pronator drift

## 2020-07-18 NOTE — ED BEHAVIORAL HEALTH ASSESSMENT NOTE - OTHER
as per HPI. appears. continuous pacing. pacing; PMR + despite patient reporting unsteady gait, no signs of impaired balance/gait on exam. perseverates "I don't feel good." somatic delusions; Quaker preoccupations (persecutory, guilt).

## 2020-07-18 NOTE — ED PROVIDER NOTE - ATTENDING CONTRIBUTION TO CARE
29 yo m with pmh of schizophrenia, presents with c/o weakness and inability to walk (while he is walking in the room).  pt says has been going on for a long time.  says "there is something in my head" and requesting an MRI.  pt was seen in ED yesterday and had CT head, negative.  pt says he has been noncompliant with his meds because "they don't work".  admits SI, says he wants to stab himself.  no previous attempts.  no HI.  exam: nad, ncat, perrl, eomi, mmm, rrr, ctab, abd soft, nt,nd, flat affect, walking in ED imp: pt with schizophrenia, here with psychosis, will consult psych

## 2020-07-18 NOTE — ED ADULT NURSE REASSESSMENT NOTE - NS ED NURSE REASSESS COMMENT FT1
during initial assessment, pt denied SI/HI. while drawing labs stated " I don't want to live anymore" when asked if he wanted to hurts himself he stated " yes, I need psych". continues to deny HI. constant observation initiated: 1:1 at bedside, belongings bagged and with security. Pt changed into gown.

## 2020-07-18 NOTE — ED BEHAVIORAL HEALTH ASSESSMENT NOTE - RISK ASSESSMENT
Moderate Acute Suicide Risk Modifiable risk factors include SI, active psychosis, psychiatric illness, lack of social support/single, unemployment.  Static risk factors include male gender.  Protective factors include Church, seeking out treatment.

## 2020-07-18 NOTE — ED PROVIDER NOTE - OBJECTIVE STATEMENT
30 year old male with pmhx of schizoaffective disorder presents to ed for inability to walk (walking in ED). pt admits feels like something is going on. pt was seen in ED yesterday, had ct head which was negative. pt also admits to SI. no ha, dizziness, head trauma, LOC, chest pain, sob, abd pain, or nausea, vomiting, diarrhea.

## 2020-07-18 NOTE — ED PROVIDER NOTE - PROGRESS NOTE DETAILS
psych aware [place]: s/o to me from RENATA King. pending transport to [facility]. pt with no acute complaints

## 2020-07-18 NOTE — ED ADULT TRIAGE NOTE - CHIEF COMPLAINT QUOTE
pt states he "can't walk" as he is walking around in the ambulance. he called ems stating he can't walk but was standing when ems arrived. denies si/hi. denies drugs and alcohol use

## 2020-07-18 NOTE — ED PROVIDER NOTE - NS ED ROS FT
Review of Systems:  	•	CONSTITUTIONAL - no fever, no diaphoresis, no chills  	•	SKIN - no rash  	•	HEMATOLOGIC - no bleeding, no bruising  	•	EYES - no eye pain, no blurry vision  	•	ENT - no change in hearing, no sore throat, no ear pain or tinnitus  	•	RESPIRATORY - no shortness of breath, no cough  	•	CARDIAC - no chest pain, no palpitations  	•	GI - no abd pain, no nausea, no vomiting, no diarrhea, no constipation  	•	GENITO-URINARY - no discharge, no dysuria; no hematuria, no increased urinary frequency  	•	MUSCULOSKELETAL - no joint paint, no swelling, no redness  	•	NEUROLOGIC - no weakness, no headache, no paresthesias, no LOC  	•	PSYCH - + SI

## 2020-07-18 NOTE — ED BEHAVIORAL HEALTH ASSESSMENT NOTE - HPI (INCLUDE ILLNESS QUALITY, SEVERITY, DURATION, TIMING, CONTEXT, MODIFYING FACTORS, ASSOCIATED SIGNS AND SYMPTOMS)
Patient is 31yo M single, unemployed, domiciled with his father, PPH of Schizoaffective disorder, recently admitted to [facility] from 6/23/20-7/15/20 and at [facility] shortly before that (for bizarre behavior, psychosis), presented to the ED today with somatic complaints of "I can't walk" and dizziness. Psychiatry was consulted to evaluate patient for suicidal ideations (expressed to nursing staff).    On arrival to the ED, patient is observed in the hallway, pacing with PCA near by for 1:1. Once in a private room for the interview, patient declines to sit down, explaining, "it makes me dizzy to sit." He is cooperative and engaged in the interview, however, interrupts periodically to state, "I don't feel good, doctor," and "I am very weak." Patient appears to be having several somatic delusions, including "head doesn't feel right," dizziness, poor coordination, weakness, and thoughts that he may have cancer. Patient is also exhibiting Orthodox preoccupations that are distressing for him. Specifically, he describes "bad thoughts" that God will punish him for. He has not been caring for himself as well, siting one week ago as the last time he took a shower. He endorses adequate sleep (7-8hr/nt), but decreased appetite leading to decreased PO intake. Of note, patient is drinking several cups of coffee while in the ED, and declining anything else.    Today patient also presents endorsing SI, which he identifies as new for him. For the last several days he admits to wanting "to pass away because I am tired of life." He has at several times during these couple of days thought of ways to go about this, mentioning one thought to stab himself in the heart with a knife. On further questioning, patient identifies several deterrents, including fear of the act itself and fear of God's punishment for suicide. He has not taken any steps toward a cohesive plan or made any preparatory acts. He admits that if he no longer had "bad thoughts" and no longer felt bad then he would not feel suicidal anymore.    Patient denies other symptoms of depression or caridad, and denies auditory hallucinations. He endorses cigarette smoking daily for the last 1 year, 1-2ppd.    Collateral was gathered from patient's father, Patient is 29yo M single, unemployed, domiciled with his father, PPH of Schizoaffective disorder, recently admitted to [facility] from 6/23/20-7/15/20 and at [facility] shortly before that (for bizarre behavior, psychosis), presented to the ED today with somatic complaints of "I can't walk" and dizziness. Psychiatry was consulted to evaluate patient for suicidal ideations (expressed to nursing staff).    On arrival to the ED, patient is observed in the hallway, pacing with PCA near by for 1:1. Once in a private room for the interview, patient declines to sit down, explaining, "it makes me dizzy to sit." He is cooperative and engaged in the interview, however, interrupts periodically to state, "I don't feel good, doctor," and "I am very weak." Patient appears to be having several somatic delusions, including "head doesn't feel right," dizziness, poor coordination, weakness, and thoughts that he may have cancer. Patient is also exhibiting Adventism preoccupations that are distressing for him. Specifically, he describes "bad thoughts" that God will punish him for. He has not been caring for himself as well, siting one week ago as the last time he took a shower. He endorses adequate sleep (7-8hr/nt), but decreased appetite leading to decreased PO intake. Of note, patient is drinking several cups of coffee while in the ED, and declining anything else.    Today patient also presents endorsing SI, which he identifies as new for him. For the last several days he admits to wanting "to pass away because I am tired of life." He has at several times during these couple of days thought of ways to go about this, mentioning one thought to stab himself in the heart with a knife. On further questioning, patient identifies several deterrents, including fear of the act itself and fear of God's punishment for suicide. He has not taken any steps toward a cohesive plan or made any preparatory acts. He admits that if he no longer had "bad thoughts" and no longer felt bad then he would not feel suicidal anymore.    Patient denies other symptoms of depression or caridad, and denies auditory hallucinations. He endorses cigarette smoking daily for the last 1 year, 1-2ppd.  Patient endorses not being compliant with his medications following discharge from [facility] because "I don't think they do anything for me."    Collateral obtained from patient's father, [phone], who was quick to get off the phone stating that he was too busy to talk.  He states that since d/c from [facility] patient has been "walking and thinking."  Reports that his son is not well and he would like the hospital to "keep him for 6 months" and at this time does not wish for the patient to come back home and that he should be d/janette to the care of his mother ([place], [phone]). Is unsure of med compliance since d/c.  Denies that pt endorsed any suicidal or homicidal ideation, intent or plan since returning back home.

## 2020-07-18 NOTE — ED BEHAVIORAL HEALTH ASSESSMENT NOTE - DESCRIPTION
Patient was in good behavioral control, not requiring any pharmacological or physical restraints. He continues to pace the hallways but keeps to himself. 1:1 was utilized. none known Patient currently lives in [place] [place] [facility] with his father. His highest level of education is "33 college credits" which is 0.5yr of college roughly. He reports that he didn't find anything he liked so he dropped out. 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 He explains that his father made him lose his job but that he remembers being "happy" working at grocery store.

## 2020-07-19 DIAGNOSIS — F25.9 SCHIZOAFFECTIVE DISORDER, UNSPECIFIED: ICD-10-CM

## 2020-07-19 DIAGNOSIS — F20.9 SCHIZOPHRENIA, UNSPECIFIED: ICD-10-CM

## 2020-07-19 PROCEDURE — 99231 SBSQ HOSP IP/OBS SF/LOW 25: CPT

## 2020-07-19 RX ORDER — RISPERIDONE 4 MG/1
2 TABLET ORAL AT BEDTIME
Refills: 0 | Status: DISCONTINUED | OUTPATIENT
Start: 2020-07-19 | End: 2020-07-24

## 2020-07-19 RX ADMIN — RISPERIDONE 2 MILLIGRAM(S): 4 TABLET ORAL at 21:55

## 2020-07-19 RX ADMIN — Medication 2 MILLIGRAM(S): at 21:55

## 2020-07-19 NOTE — PROGRESS NOTE BEHAVIORAL HEALTH - NSBHFUPINTERVALCCFT_PSY_A_CORE
pt seen , discussed with staff , pt seen. pt is observed religiously preoccupied , intrusive , somatic preoccupation . mood anxious , depress . denies a/v hallucination.  no  acute evnt overnight. denies s/h ideations intent or plan.

## 2020-07-19 NOTE — PROGRESS NOTE BEHAVIORAL HEALTH - NSBHFUPINTERVALHXFT_PSY_A_CORE
suicidal ideations, worsening delusions  "I don't feel good."  Patient is 29yo M single, unemployed, domiciled with his father, PPH of Schizoaffective disorder, recently admitted to [facility] from 6/23/20-7/15/20 and at [facility] shortly before that (for bizarre behavior, psychosis), presented to the ED today with somatic complaints of "I can't walk" and dizziness. Psychiatry was consulted to evaluate patient for suicidal ideations (expressed to nursing staff).    On arrival to the ED, patient is observed in the hallway, pacing with PCA near by for 1:1. Once in a private room for the interview, patient declines to sit down, explaining, "it makes me dizzy to sit." He is cooperative and engaged in the interview, however, interrupts periodically to state, "I don't feel good, doctor," and "I am very weak." Patient appears to be having several somatic delusions, including "head doesn't feel right," dizziness, poor coordination, weakness, and thoughts that he may have cancer. Patient is also exhibiting Evangelical preoccupations that are distressing for him. Specifically, he describes "bad thoughts" that God will punish him for. He has not been caring for himself as well, siting one week ago as the last time he took a shower. He endorses adequate sleep (7-8hr/nt), but decreased appetite leading to decreased PO intake. Of note, patient is drinking several cups of coffee while in the ED, and declining anything else.    Today patient also presents endorsing SI, which he identifies as new for him. For the last several days he admits to wanting "to pass away because I am tired of life." He has at several times during these couple of days thought of ways to go about this, mentioning one thought to stab himself in the heart with a knife. On further questioning, patient identifies several deterrents, including fear of the act itself and fear of God's punishment for suicide. He has not taken any steps toward a cohesive plan or made any preparatory acts. He admits that if he no longer had "bad thoughts" and no longer felt bad then he would not feel suicidal anymore.    Patient denies other symptoms of depression or caridad, and denies auditory hallucinations. He endorses cigarette smoking daily for the last 1 year, 1-2ppd.  Patient endorses not being compliant with his medications following discharge from [facility] because "I don't think they do anything for me."    Collateral obtained from patient's father, [phone], who was quick to get off the phone stating that he was too busy to talk.  He states that since d/c from [facility] patient has been "walking and thinking."  Reports that his son is not well and he would like the hospital to "keep him for 6 months" and at this time does not wish for the patient to come back home and that he should be d/janette to the care of his mother ([place], [phone]). Is unsure of med compliance since d/c.  Denies that pt endorsed any suicidal or homicidal ideation, intent or plan since returning back home.

## 2020-07-19 NOTE — PROGRESS NOTE BEHAVIORAL HEALTH - OTHER
continuous pacing. pacing; PMR + despite patient reporting unsteady gait, no signs of impaired balance/gait on exam. perseverates "I don't feel good." somatic delusions; Uatsdin preoccupations (persecutory, guilt).

## 2020-07-19 NOTE — H&P ADULT - NSHPPHYSICALEXAM_GEN_ALL_CORE
Vital Signs Last 24 Hrs  T(C): 37.3 (07-18-20 @ 17:21)  T(F): 99.1 (07-18-20 @ 17:21), Max: 99.1 (07-18-20 @ 17:21)  HR: 110 (07-18-20 @ 17:21) (110 - 110)  BP: 134/87 (07-18-20 @ 17:21)  BP(mean): --  RR: 18 (07-18-20 @ 17:21) (18 - 18)  SpO2: 96% (07-18-20 @ 17:21) (96% - 96%)  Wt(kg): --

## 2020-07-19 NOTE — PATIENT PROFILE BEHAVIORAL HEALTH - [facility]
Detail Level: Detailed Quality 111:Pneumonia Vaccination Status For Older Adults: Pneumococcal Vaccination Previously Received Quality 137: Melanoma: Continuity Of Care - Recall System: Patient information entered into a recall system that includes: target date for the next exam specified AND a process to follow up with patients regarding missed or unscheduled appointments (4) no impairment

## 2020-07-19 NOTE — H&P ADULT - NSHPLABSRESULTS_GEN_ALL_CORE
16.7   9.72  )-----------( 276      ( 18 Jul 2020 18:00 )             48.0       07-18    135  |  96<L>  |  23<H>  ----------------------------<  105<H>  4.6   |  22  |  0.9    Ca    11.2<H>      18 Jul 2020 18:00    TPro  8.4<H>  /  Alb  5.6<H>  /  TBili  0.9  /  DBili  x   /  AST  20  /  ALT  87<H>  /  AlkPhos  136<H>  07-18                      Lactate Trend            CAPILLARY BLOOD GLUCOSE

## 2020-07-19 NOTE — PATIENT PROFILE BEHAVIORAL HEALTH - FUNCTIONAL SCREEN CURRENT LEVEL: TOILETING, MLM
"INFECTIOUS DISEASES PROGRESS NOTE    Patient:  Sunny Lacey  YOB: 1919  MRN: [id number]   Admit date: 7/8/2020   Admitting Physician: Phong Joseph MD  Primary Care Physician: Nevaeh Thornton PA    Chief Complaint: \"I was cold \"        Interval History:  Patient's room is extremely hot.  Her door had been shut and someone had turned the heat on.  She did say that she was cold earlier.    She denied any abdominal pain but apparently family reported to Dr. Joseph that she had abdominal pain.  Per nursing she had a bitter taste in her mouth that improved when her upper plate dentures were cleaned.    Allergies: No Known Allergies    Current Meds:     Current Facility-Administered Medications:   •  acetaminophen (TYLENOL) tablet 650 mg, 650 mg, Oral, BID, Phong Joseph MD, 650 mg at 07/15/20 0832  •  acetaminophen (TYLENOL) tablet 650 mg, 650 mg, Oral, Q6H PRN, Phong Joseph MD  •  albuterol (PROVENTIL) nebulizer solution 0.083% 2.5 mg/3mL, 2.5 mg, Nebulization, Q6H PRN, Phong Joseph MD  •  ALPRAZolam (XANAX) tablet 0.25 mg, 0.25 mg, Oral, Daily PRN, Phong Joseph MD  •  ALPRAZolam (XANAX) tablet 0.25 mg, 0.25 mg, Oral, Nightly, Phong Joseph MD, 0.25 mg at 07/14/20 2119  •  amLODIPine (NORVASC) tablet 10 mg, 10 mg, Oral, Daily, Phong Joseph MD, 10 mg at 07/15/20 0832  •  brimonidine-timolol (COMBIGAN) 0.2-0.5 % ophthalmic solution 1 drop, 1 drop, Right Eye, Q12H, Phong Joseph MD, 1 drop at 07/15/20 0831  •  citalopram (CeleXA) tablet 20 mg, 20 mg, Oral, Daily, Phong Joseph MD, 20 mg at 07/15/20 0832  •  dextrose 5 % and sodium chloride 0.45 % with KCl 20 mEq/L infusion, 50 mL/hr, Intravenous, Continuous, Phong Joseph MD, Last Rate: 50 mL/hr at 07/15/20 0318, 50 mL/hr at 07/15/20 0318  •  diclofenac (VOLTAREN) 1 % gel 4 g, 4 g, Topical, 4x Daily, Phong Joseph MD, 4 g at 07/15/20 0831  •  " "docusate sodium (COLACE) capsule 100 mg, 100 mg, Oral, Daily, Phong Joseph MD, 100 mg at 07/15/20 0832  •  enoxaparin (LOVENOX) syringe 40 mg, 40 mg, Subcutaneous, Q24H, Phong Joseph MD, 40 mg at 20 1728  •  ferrous sulfate tablet 325 mg, 325 mg, Oral, Daily With Breakfast, Phong Joseph MD, 325 mg at 07/15/20 0832  •  glycerin (ADULT) rectal suppository 2 g, 2 g, Rectal, Daily PRN, Phong Joseph MD, 2 g at 07/10/20 1128  •  latanoprost (XALATAN) 0.005 % ophthalmic solution 1 drop, 1 drop, Both Eyes, Nightly, Phong Joseph MD, 1 drop at 20  •  lidocaine (LMX) 4 % cream, , Topical, 4x Daily PRN, Phong Joseph MD  •  megestrol (MEGACE) 40 MG/ML suspension 800 mg, 800 mg, Oral, Daily, Phong Joseph MD, 800 mg at 07/15/20 0832  •  ondansetron (ZOFRAN) injection 4 mg, 4 mg, Intravenous, Q6H PRN, Phong Joseph MD, 4 mg at 206  •  Pharmacy to Dose enoxaparin (LOVENOX), , Does not apply, Continuous PRN, Phong Joseph MD  •  potassium chloride (MICRO-K) CR capsule 20 mEq, 20 mEq, Oral, BID With Meals, Oliver Starkey MD, 20 mEq at 07/15/20 0832  •  sodium chloride 0.9 % bolus 1,464 mL, 30 mL/kg, Intravenous, PRN, Phong Joseph MD  •  sodium chloride 0.9 % flush 10 mL, 10 mL, Intravenous, Q12H, Phogn Joseph MD, 10 mL at 20  •  sodium chloride 0.9 % flush 10 mL, 10 mL, Intravenous, PRN, Phong Joseph MD      Review of Systems   Constitutional: Negative for fever.   Respiratory: Negative for shortness of breath.        Objective     Vital Signs:  Temp (24hrs), Av.8 °F (36.6 °C), Min:97.6 °F (36.4 °C), Max:97.9 °F (36.6 °C)      /69 (BP Location: Right arm, Patient Position: Sitting)   Pulse 92   Temp 97.9 °F (36.6 °C) (Oral)   Resp 18   Ht 170.2 cm (67\")   Wt 75.3 kg (166 lb)   SpO2 92%   BMI 26.00 kg/m²         Physical Exam   General: The patient is a " 89-year-old female appearing better than her stated age lying in bed in no acute distress  HEENT: Sclera anicteric and noninjected  Respiratory: Effort even and unlabored  Abdomen: Soft, nontender, nondistended  Psych: She is pleasant and cooperative  Neuro: She is alert.  She is slow to answer questions.  Her speech is clear.    Results Review:    I reviewed the patient's new clinical results.    Lab Results:  CBC:   Lab Results   Lab 07/09/20  0557 07/10/20  0448 07/11/20  0500 07/12/20  0248 07/13/20  0403 07/14/20  0407 07/15/20  0537   WBC 11.03* 9.00 6.82 7.39 8.51 8.06 8.40   HEMOGLOBIN 10.8* 10.6* 10.6* 11.8* 13.6 12.8 13.9   HEMATOCRIT 34.6 33.0* 32.8* 36.1 41.5 39.1 41.9   PLATELETS 85* 103* 111* 129* 168 184 231         CMP:   Lab Results   Lab 07/13/20  0403 07/14/20  0407 07/15/20  0537   SODIUM 145 148* 143   POTASSIUM 3.0* 3.4* 3.5   CHLORIDE 106 111* 105   CO2 28.0 28.0 27.0   BUN 8 11 11   CREATININE 0.66 0.75 0.72   CALCIUM 9.5 9.3 9.8*   GLUCOSE 133* 122* 113*         Culture Results:    Blood Culture   Date Value Ref Range Status   07/08/2020 No growth at 5 days  Final   07/08/2020 No growth at 5 days  Final       Microbiology Results Abnormal     Procedure Component Value - Date/Time    Blood Culture - Blood, Arm, Right [[id number]] Collected:  07/08/20 0445    Lab Status:  Final result Specimen:  Blood from Arm, Right Updated:  07/13/20 0500     Blood Culture No growth at 5 days    Blood Culture - Blood, Hand, Right [[id number]] Collected:  07/08/20 0445    Lab Status:  Final result Specimen:  Blood from Hand, Right Updated:  07/13/20 0500     Blood Culture No growth at 5 days    Respiratory Panel, PCR - Swab, Nasopharynx [[id number]]  (Normal) Collected:  07/08/20 0505    Lab Status:  Final result Specimen:  Swab from Nasopharynx Updated:  07/08/20 0652     ADENOVIRUS, PCR Not Detected     Coronavirus 229E Not Detected     Coronavirus HKU1 Not Detected     Coronavirus NL63 Not Detected      Coronavirus OC43 Not Detected     Human Metapneumovirus Not Detected     Human Rhinovirus/Enterovirus Not Detected     Influenza B PCR Not Detected     Parainfluenza Virus 1 Not Detected     Parainfluenza Virus 2 Not Detected     Parainfluenza Virus 3 Not Detected     Parainfluenza Virus 4 Not Detected     Bordetella pertussis pcr Not Detected     Influenza A H1 2009 PCR Not Detected     Chlamydophila pneumoniae PCR Not Detected     Mycoplasma pneumo by PCR Not Detected     Influenza A PCR Not Detected     Influenza A H3 Not Detected     Influenza A H1 Not Detected     RSV, PCR Not Detected     Bordetella parapertussis PCR Not Detected    Narrative:       The coronavirus on the RVP is NOT COVID-19 and is NOT indicative of infection with COVID-19.     COVID PRE-OP / PRE-PROCEDURE SCREENING ORDER (NO ISOLATION) - Swab, Nasopharynx [[id number]] Collected:  07/08/20 0055    Lab Status:  Final result Specimen:  Swab from Nasopharynx Updated:  07/08/20 0154    Narrative:       The following orders were created for panel order COVID PRE-OP / PRE-PROCEDURE SCREENING ORDER (NO ISOLATION) - Swab, Nasopharynx.  Procedure                               Abnormality         Status                     ---------                               -----------         ------                     COVID-19,CEPHEID,COR/LUIS/...[[id number]]  Normal              Final result                 Please view results for these tests on the individual orders.    COVID-19,CEPHEID,COR/LUIS/PAD IN-HOUSE(OR EMERGENT/ADD-ON),NP SWAB IN TRANSPORT MEDIA 3-4 HR TAT - Swab, Nasopharynx [[id number]]  (Normal) Collected:  07/08/20 0055    Lab Status:  Final result Specimen:  Swab from Nasopharynx Updated:  07/08/20 0154     COVID19 Not Detected    Narrative:       Fact sheet for providers: https://www.fda.gov/media/126686/download     Fact sheet for patients: https://www.fda.gov/media/346022/download                Radiology:   Imaging Results (Last 72 Hours)     ** No  results found for the last 72 hours. **          Assessment/Plan     Active Hospital Problems    Diagnosis   • At risk for falls   • Hypokalemia   • Sepsis (CMS/HCC)   • Pneumonia due to infectious organism       IMPRESSION:  1. Fever of uncertain etiology.  Pneumonia?   Patient has been on empiric antibiotics for 6 days here at [facility]...  No other source has been found.  Urine and blood cultures from [place] prior to transfer are negative.  2. Leukocytosis-resolved  3.  abdominal pain?  Evaluated by GI.  Their note reviewed.      RECOMMENDATION:       Infectious diseases will sign off  Please reconsult if needed.          Ligia Dominguez MD  07/15/20  15:59       0 = independent

## 2020-07-20 PROCEDURE — 99232 SBSQ HOSP IP/OBS MODERATE 35: CPT | Mod: GC

## 2020-07-20 RX ORDER — BENZTROPINE MESYLATE 1 MG
1 TABLET ORAL EVERY 12 HOURS
Refills: 0 | Status: DISCONTINUED | OUTPATIENT
Start: 2020-07-20 | End: 2020-08-31

## 2020-07-20 RX ORDER — NICOTINE POLACRILEX 2 MG
4 GUM BUCCAL
Refills: 0 | Status: DISCONTINUED | OUTPATIENT
Start: 2020-07-20 | End: 2020-08-31

## 2020-07-20 RX ADMIN — Medication 2 MILLIGRAM(S): at 23:44

## 2020-07-20 RX ADMIN — RISPERIDONE 2 MILLIGRAM(S): 4 TABLET ORAL at 20:13

## 2020-07-20 RX ADMIN — Medication 1 MILLIGRAM(S): at 20:13

## 2020-07-20 RX ADMIN — Medication 4 MILLIGRAM(S): at 16:13

## 2020-07-20 RX ADMIN — Medication 1 MILLIGRAM(S): at 14:15

## 2020-07-20 NOTE — PROGRESS NOTE BEHAVIORAL HEALTH - SUMMARY
Patient is a 30 year old male with pph of schizoaffective disorder, nonspecified, recent IPP discharge 7/15. Patient admitted due to suicidal ideation in context of medication non-adherence. Patient denies current plan or intent, endorses continued ideation. Patient is somatically preoccupied with weakness. Patient has some rigidity present and is observed pacing throughout unit.    Schizoaffective disorder  -Risperidone 2 mg at bedtime    Rigidity/EPS  -benztropine 1 mg BID  -benztropine 1 mg PRN, every 12 hours    Acute anxiety  -lorazepam 2 mg PRN Q8H Patient is a 30 year old male with pph of schizoaffective disorder, nonspecified, recent IPP discharge 7/15. Patient admitted due to suicidal ideation in context of medication non-adherence. Patient denies current plan or intent, endorses continued ideation. Patient is somatically preoccupied with weakness. Patient has some rigidity present and is observed pacing throughout unit.    Schizoaffective disorder  -Risperidone 2 mg at bedtime    Rigidity/EPS  -benztropine 1 mg BID  -benztropine 1 mg PRN, every 12 hours    Metabolic charting  -follow up labs     Acute anxiety  -lorazepam 2 mg PRN Q8H

## 2020-07-20 NOTE — PROGRESS NOTE BEHAVIORAL HEALTH - RISK ASSESSMENT
Patient endorses suicidal ideation in the context of medication non-adherence since discharge from IPP on 7/15. Patient denied to the writer intent or plan but chart review documented plan to stab heart. Patient told previous provider that deterrent to suicide includes Orthodoxy reason, fear of committing act. Patient endorses suicidal ideation in the context of medication non-adherence since discharge from IPP on 7/15. Patient denied to the writer intent or plan but chart review documented plan to stab heart. Patient told previous provider that deterrent to suicide includes Adventism reason, fear of committing act.

## 2020-07-20 NOTE — PROGRESS NOTE BEHAVIORAL HEALTH - MODIFICATIONS
seen/discussed with resident. Somatic preoccupations evident. No s/h ideation. Pt admits med non compliance since d/c last week.  Continue risperdal for now

## 2020-07-20 NOTE — PROGRESS NOTE BEHAVIORAL HEALTH - NSBHFUPINTERVALHXFT_PSY_A_CORE
Patient states that he feels significant physical weakness. The weakness has impacted his ability to walk and move, and is his primary concern. Patient observed pacing around unit without difficulty or rigidity. Patient reported not being able to take medications since last discharge because the pharmacy has been closed. Patient is unable to explain reasons why he came to the hospital; states 911 is the reason for IPP admission. Patient denies A/C hallucinations. Endorses suicidal ideation without intent or plan. Patient denies HI. Patient states that he feels significant physical weakness. The weakness has impacted his ability to walk and move, and is his primary concern. Patient observed pacing around unit with rigidity. Patient reported not being able to take medications since last discharge because the pharmacy has been closed. Reports coming to hospital after having suicidal ideation without plan or intent. Patient denies A/C hallucinations.

## 2020-07-20 NOTE — CHART NOTE - NSCHARTNOTEFT_GEN_A_CORE
Pt is a 30 year old, single male was re-admitted due to psychosis and suicidal ideation.  Pt. was discharged from this unit on 7/15.  Shortly before his last admission, he had been admitted to IPP at [facility].  He has a diagnosis of schizoaffective disorder and multiple IPP admission.  Pt. is followed by an ACT team in the [phone].  He presented to the emergency room with various somatic complaints such as not being able to walk.  He also reported feeling dizzy and verbalized concerns he may have cancer.  Pt. presented as religiously preoccupied as well.  Pt. verbalized suicidal ideation.  There is no known history of suicidal ideation or history of suicide attempts.  He reported having thoughts of stabbing himself.  Pt. has been able to contract for safety while on the unit.  There is no known history of substance abuse or legal issues.    Pt. was residing with his father in the community at the time of admission.  It is unclear, due to collateral obtained in the ED, if pt. is able to return to his father's home.  A message was left for pt.'s father.  Pt. is not  and does not have children.  Pt. has some college education and is currently unemployed.  If pt. does not compensate, state placement may be considered as an option.  Pt. is not psychiatrically stable for discharge at this time.    Sexual History-  Pt. identifies as heterosexual    History of suicide attempts or self-injurious behaviors- No known history    Traumatic Losses-  None reported    Community Supports-  ACT team    Family History-  Pt. is not  and does not have children. Both parents, who are , are involved in pt.'s life.    Substance Use Assessment-  No known history    Leisure Activity Assessment-  Unable to obtain    Life Goals-  Unable to obtain

## 2020-07-20 NOTE — PROGRESS NOTE BEHAVIORAL HEALTH - NSBHFUSTRESSORSINTER_PSY_A_CORE
Inadequate social supports/Current or pending social isolation/Non-compliant or not receiving treatment/Recent inpatient discharge/Perceived burden on family or others

## 2020-07-20 NOTE — CONSULT NOTE ADULT - SUBJECTIVE AND OBJECTIVE BOX
ANTONINA SHAFFER  30y  Male      Patient is a 30y old  Male who presents with a chief complaint of 30 YEARS OLD MALE COME FOR PSYCHIATRIC EVALUATION . (19 Jul 2020 03:55)    HPI:  30 year old male with pmhx of schizoaffective disorder presents to ed for inability to walk (walking in ED). pt admits feels like something is going on. pt was seen in ED yesterday, had ct head which was negative. pt also admits to SI. no ha, dizziness, head trauma, LOC, chest pain, sob, abd pain, or nausea, vomiting, diarrhea. (19 Jul 2020 03:55)    INTERVAL HPI/OVERNIGHT EVENTS:  HEALTH ISSUES - PROBLEM Dx:  Schizoaffective disorder, unspecified type  Schizophrenia: Schizophrenia  Schizoaffective disorder    recently d/c and readimtteed    PAST MEDICAL & SURGICAL HISTORY:  Schizoaffective disorder  Schizophrenia  No significant past surgical history    FAMILY HISTORY:  No pertinent family history in first degree relatives    LORazepam     Tablet 2 milliGRAM(s) Oral every 8 hours PRN  risperiDONE   Tablet 2 milliGRAM(s) Oral at bedtime      REVIEW OF SYSTEMS:  CONSTITUTIONAL: No fever, weight loss, or fatigue  EYES: No eye pain, visual disturbances, or discharge  ENMT:  No difficulty hearing, tinnitus, vertigo; No sinus or throat pain  NECK: No pain or stiffness  BREASTS: No pain, masses, or nipple discharge  RESPIRATORY: No cough, wheezing, chills or hemoptysis; No shortness of breath  CARDIOVASCULAR: No chest pain, palpitations, dizziness, or leg swelling  GASTROINTESTINAL: No abdominal or epigastric pain. No nausea, vomiting, or hematemesis; No diarrhea or constipation. No melena or hematochezia.  GENITOURINARY: No dysuria, frequency, hematuria, or incontinence  NEUROLOGICAL: No headaches, memory loss, loss of strength, numbness, or tremors  SKIN: No itching, burning, rashes, or lesions   LYMPH NODES: No enlarged glands  ENDOCRINE: No heat or cold intolerance; No hair loss  MUSCULOSKELETAL: No joint pain or swelling; No muscle, back, or extremity pain  PSYCHIATRIC: as per hpi and previous psych history  HEME/LYMPH: No easy bruising, or bleeding gums  ALLERY AND IMMUNOLOGIC: No hives or eczema    T(C): 36.2 (07-19-20 @ 15:48), Max: 36.2 (07-19-20 @ 15:48)  HR: 137 (07-19-20 @ 15:48) (137 - 137)  BP: 120/60 (07-19-20 @ 15:48) (120/60 - 120/60)  RR: 16 (07-19-20 @ 15:48) (16 - 16)  SpO2: --  Wt(kg): --Vital Signs Last 24 Hrs  T(C): 36.2 (19 Jul 2020 15:48), Max: 36.2 (19 Jul 2020 15:48)  T(F): 97.2 (19 Jul 2020 15:48), Max: 97.2 (19 Jul 2020 15:48)  HR: 137 (19 Jul 2020 15:48) (137 - 137)  BP: 120/60 (19 Jul 2020 15:48) (120/60 - 120/60)  BP(mean): --  RR: 16 (19 Jul 2020 15:48) (16 - 16)  SpO2: --    PHYSICAL EXAM:  GENERAL: NAD,well-developed  HEAD:  Atraumatic, Normocephalic  EYES: EOMI, PERRLA, conjunctiva and sclera clear  ENMT: Moist mucous membranes, Good dentition, No lesions  NECK: Supple, No JVD, Normal thyroid  CHEST/LUNG: Clear bs bilaterally; No rales, rhonchi, wheezing  HEART: Regular rate and rhythm; No murmurs, rubs, or gallops  ABDOMEN: Soft, Nontender, Nondistended; Bowel sounds present  EXTREMITIES:  2+ Peripheral Pulses, No clubbing, cyanosis, or edema  LYMPH: No lymphadenopathy noted  SKIN: No rashes or lesions  Neuro: alert  no focal deficits    Consultant(s) Notes Reviewed:  [x ] YES  [ ] NO  Care Discussed with Consultants/Other Providers [ x] YES  [ ] NO    LABS:                        16.7   9.72  )-----------( 276      ( 18 Jul 2020 18:00 )             48.0     07-18    135  |  96<L>  |  23<H>  ----------------------------<  105<H>  4.6   |  22  |  0.9    Ca    11.2<H>      18 Jul 2020 18:00    TPro  8.4<H>  /  Alb  5.6<H>  /  TBili  0.9  /  DBili  x   /  AST  20  /  ALT  87<H>  /  AlkPhos  136<H>  07-18        CAPILLARY BLOOD GLUCOSE                RADIOLOGY & ADDITIONAL TESTS:    Imaging Personally Reviewed:  [ ] YES  [ ] NO

## 2020-07-20 NOTE — PROGRESS NOTE BEHAVIORAL HEALTH - DETAILS
generalized weakness Patient stated to the writer that he does not have thoughts of killing himself; chart shows that patient told provider in past about plan to kill self by stabbing heart.

## 2020-07-20 NOTE — PROGRESS NOTE BEHAVIORAL HEALTH - OTHER
Patient pacing unit perseverates "I don't feel good." somatic delusions; Zoroastrian preoccupations (persecutory, guilt). cogwheeling No unsteadiness, patient appears stiff and rigid when pacing unit perseverates "very weak" Endorses thoughts that it is better if he is dead but does not have plan, intent

## 2020-07-20 NOTE — PROGRESS NOTE BEHAVIORAL HEALTH - NSBHFUSUICIDEPROTECINTER_PSY_A_CORE
Fear of death or the actual act of killing self/Sikh beliefs/Cultural, spiritual and/or moral attitudes against suicide

## 2020-07-21 PROCEDURE — 99232 SBSQ HOSP IP/OBS MODERATE 35: CPT | Mod: GC

## 2020-07-21 RX ADMIN — Medication 2 MILLIGRAM(S): at 23:28

## 2020-07-21 RX ADMIN — Medication 1 MILLIGRAM(S): at 20:01

## 2020-07-21 RX ADMIN — RISPERIDONE 2 MILLIGRAM(S): 4 TABLET ORAL at 20:01

## 2020-07-21 RX ADMIN — Medication 1 MILLIGRAM(S): at 08:11

## 2020-07-21 NOTE — PROGRESS NOTE BEHAVIORAL HEALTH - OTHER
Patient has reduced rigidity on assessment. Not assessed; patient remained in bed throughout morning. perseverates "very weak" Endorses passive thoughts of suicidality Patient has reduced rigidity on assessment compared to yesterday. Patient has masked facie; intense stare when speaking with writer. Has rigid walk, paces unit. perseverates about body weakness that he feels

## 2020-07-21 NOTE — PROGRESS NOTE BEHAVIORAL HEALTH - NSBHFUPINTERVALHXFT_PSY_A_CORE
Patient reported improved rigidity and continued feelings of weakness. He reported pacing all night, poor sleep and poor appetite. Patient endorses suicidal thoughts but denies plan and intent. He denies A/V hallucinations. Patient interviewed multiple times today. Has remained isolative and refused vitals, blood draw, and meals. Patient reported improved rigidity and continued feelings of weakness. He reported pacing all night, poor sleep and poor appetite. Patient endorses suicidal thoughts but denies plan and intent. He denies A/V hallucinations. Patient reports pacing throughout night and poor sleep. Has improved rigidity but continues to complain of weakness, believing his condition is due to a medical condition. Patient has doubts that he will improve. Patient reported that his father has pancreatic cancer and finds it difficult to care for him because he cannot care for himself. Patient endorses suicidal thoughts but denies plan and intent. He denies A/V hallucinations. Patient reports pacing throughout night and poor sleep. Has improved rigidity but continues to complain of weakness, believing his condition is due to a medical condition. Patient has doubts that he will improve. Patient reported that his father has pancreatic cancer and finds it difficult to care for him because he cannot care for himself. Patient endorses very vague suicidal thoughts but denies plan and intent. Has no history of self harm. He denies A/V hallucinations.

## 2020-07-21 NOTE — PROGRESS NOTE BEHAVIORAL HEALTH - SUMMARY
Patient is a 30 year old male with pph of schizoaffective disorder, nonspecified, recent IPP discharge 7/15. Patient admitted due to suicidal ideation in context of medication non-adherence. Patient denies current plan or intent, endorses continued passive ideation. Patient continues to be somatically preoccupied with weakness. Patient's rigidity improved from yesterday. Patient is isolative today.    Schizoaffective disorder  -Risperidone 2 mg at bedtime    Rigidity/EPS  -benztropine 1 mg BID  -benztropine 1 mg PRN, every 12 hours    Metabolic charting  -follow up labs    Acute anxiety  -lorazepam 2 mg PRN Q8H.

## 2020-07-21 NOTE — CHART NOTE - NSCHARTNOTEFT_GEN_A_CORE
Umberto remains on the unit for continued treatment, safety and observation.  met with Umberto one on one today for the purpose of discussing his re-admission to the hospital and encouraging group attendance and participation.  Patient is known to this writer from a recent previous admission.  Umberto discussed how he is back in the hospital because he feels “sick” and “weak”.  He reports that something is wrong with his body. When asked about intrusive thoughts, patient reports that they are still there “but not as bad”.  He then asked this writer “are the bad thoughts my fault?”   provided emotional support.     Umberto remained in bed most of the day.  He is occasionally seen pacing the unit.  He did not attend unit groups and he does not socialize with peers.   oriented patient to unit groups and encouraged him to attend.  Umberto declined group and said “I can’t, I’m too anxious” and reports that he would be unable to sit still throughout group.       will continue to meet with patient for education, support, and to assist in planning a safe discharge. Umberto remains on the unit for continued treatment, safety and observation.  met with Umberto one on one today for the purpose of discussing his re-admission to the hospital and encouraging group attendance and participation.  Patient is known to this writer from a recent previous admission.  Umberto discussed how he is back in the hospital because he feels “sick” and “weak”.  He reports that something is wrong with his body. When asked about intrusive thoughts, patient reports that they are still there “but not as bad”.  Umberto reports that he was "bored out of my mind" when he was last discharged from the hospital.  He said that boredom and lack of things to do greatly contributes to his "bad thoughts".   SW provided empathy and support and discussed with patients activities he can do to stay occupied at home (watch TV, read a book, talk to family/friends).      Umberto remained in bed most of the day.  He is occasionally seen pacing the unit.  He did not attend unit groups and he does not socialize with peers.   oriented patient to unit groups and encouraged him to attend.  Umberto declined group and said “I can’t, I’m too anxious” and reports that he would be unable to sit still throughout group.       will continue to meet with patient for education, support, and to assist in planning a safe discharge.

## 2020-07-22 DIAGNOSIS — F25.1 SCHIZOAFFECTIVE DISORDER, DEPRESSIVE TYPE: ICD-10-CM

## 2020-07-22 PROCEDURE — 99231 SBSQ HOSP IP/OBS SF/LOW 25: CPT

## 2020-07-22 RX ADMIN — Medication 2 MILLIGRAM(S): at 23:14

## 2020-07-22 RX ADMIN — RISPERIDONE 2 MILLIGRAM(S): 4 TABLET ORAL at 20:06

## 2020-07-22 RX ADMIN — Medication 1 MILLIGRAM(S): at 08:05

## 2020-07-22 RX ADMIN — Medication 1 MILLIGRAM(S): at 20:06

## 2020-07-22 NOTE — PROGRESS NOTE BEHAVIORAL HEALTH - NSBHFUPINTERVALCCFT_PSY_A_CORE
Pt is isolative, and has spent much of the day in bed.  His mood is neutral; he says "I feel like staying in the bed".   Some Cheondoism preoccupation evident; no c/o stiffness

## 2020-07-22 NOTE — CHART NOTE - NSCHARTNOTEFT_GEN_A_CORE
The treatment met with pt. to review treatment plan, medications and discharge plan.  Pt refused to speak with writer.  He states he only wants to remain in bed.  It was explained to pt. how remaining in bed can exacerbate existing depression symptoms.  Pt did not respond to this.  Pt. was also encouraged to shower, but stated he did not want to.  Pt. also presents with Shinto preoccupation.  Writer will continue attempts to engage pt.  He denies any suicidal or homicidal ideation or any A/V hallucinations.  Pt is taking his medication as prescribed.    Once stable for discharge, pt. will return home with one of his parents.  He will continue treatment with his ACT team.  Writer has presented the option of referring pt. for state placement, however, the attending psychiatrist does not feel pt. warrants a state referral.  Pt. is not psychiatrically stable for discharge at this time.    Mental Status Exam:    Mood-  Depressed    Sleep-  Normal    Appetite-  Normal    ADL's-  Poor    Thought Process-  Disorganized/Perseverative    Observation-  Routine

## 2020-07-23 PROCEDURE — 99232 SBSQ HOSP IP/OBS MODERATE 35: CPT | Mod: GC

## 2020-07-23 RX ADMIN — Medication 1 MILLIGRAM(S): at 08:00

## 2020-07-23 RX ADMIN — Medication 1 MILLIGRAM(S): at 20:04

## 2020-07-23 RX ADMIN — Medication 2 MILLIGRAM(S): at 22:43

## 2020-07-23 RX ADMIN — RISPERIDONE 2 MILLIGRAM(S): 4 TABLET ORAL at 20:04

## 2020-07-23 NOTE — PROGRESS NOTE BEHAVIORAL HEALTH - MODIFICATIONS
seen/discussed with resident. Pt is isolative but less religiously preoccupied. Will continue tx plan

## 2020-07-23 NOTE — PROGRESS NOTE BEHAVIORAL HEALTH - SUMMARY
Patient is a 30 year old male with pph of schizoaffective disorder, nonspecified, recent IPP discharge 7/15. Patient admitted due to suicidal ideation in context of medication non-adherence. Patient denies current plan or intent, continues to endorse vague, passive suicidal ideation. Patient continues to be somatically preoccupied with weakness and remains isolative.    Schizoaffective disorder  -Risperidone 2 mg at bedtime    Rigidity/EPS  -benztropine 1 mg BID  -benztropine 1 mg PRN, every 12 hours    Metabolic charting  -follow up labs    Acute anxiety  -lorazepam 2 mg PRN Q8H.

## 2020-07-23 NOTE — PROGRESS NOTE BEHAVIORAL HEALTH - NSBHFUPINTERVALHXFT_PSY_A_CORE
Patient reports continued weakness today. Feels as if he is not walking normally and that his body will not improve. This brings about thoughts that he is better off dead. Believes ending his life will remove the discomfort of weakness in his body. Patient denies intent and plan. Has remained in bed all day. Patient has refused lab work and vital signs today; agrees for tomorrow. Patient expresses appreciation for staff helping and encouraging him, is able to smile at jokes, otherwise has flat affect.

## 2020-07-24 PROCEDURE — 99232 SBSQ HOSP IP/OBS MODERATE 35: CPT | Mod: GC

## 2020-07-24 RX ORDER — RISPERIDONE 4 MG/1
25 TABLET ORAL ONCE
Refills: 0 | Status: COMPLETED | OUTPATIENT
Start: 2020-07-24 | End: 2020-07-24

## 2020-07-24 RX ORDER — HYDROXYZINE HCL 10 MG
50 TABLET ORAL AT BEDTIME
Refills: 0 | Status: DISCONTINUED | OUTPATIENT
Start: 2020-07-24 | End: 2020-08-31

## 2020-07-24 RX ORDER — RISPERIDONE 4 MG/1
2 TABLET ORAL EVERY 12 HOURS
Refills: 0 | Status: DISCONTINUED | OUTPATIENT
Start: 2020-07-24 | End: 2020-08-27

## 2020-07-24 RX ADMIN — Medication 1 MILLIGRAM(S): at 20:45

## 2020-07-24 RX ADMIN — Medication 1 MILLIGRAM(S): at 08:31

## 2020-07-24 RX ADMIN — Medication 2 MILLIGRAM(S): at 22:39

## 2020-07-24 RX ADMIN — Medication 50 MILLIGRAM(S): at 22:39

## 2020-07-24 RX ADMIN — RISPERIDONE 2 MILLIGRAM(S): 4 TABLET ORAL at 20:45

## 2020-07-24 RX ADMIN — RISPERIDONE 25 MILLIGRAM(S): 4 TABLET ORAL at 16:20

## 2020-07-24 NOTE — PROGRESS NOTE BEHAVIORAL HEALTH - MODIFICATIONS
seen/discussed with resident.  Pt isolative but is pleasant on approach. More verbal; Mandaen preoccupations/concerns noted

## 2020-07-24 NOTE — CHART NOTE - NSCHARTNOTEFT_GEN_A_CORE
The treatment team met with pt. to review treatment plan, medications and discharge plan.  Pt. requires a wealth of encouragement to sit up in bed and speak with treatment team and/or writer.  Pt. spends most of his time, isolated in bed.  He has minimal insight into his illness.  Writer attempted to encouraged pt. to try attend group, however, pt. refused.  Writer will continue attempts to help pt. decrease isolation.  Pt. is no longer verbalizing suicidal ideation.  He refuses labs and vitals and dismisses the benefit of having them done even when the importance of doing so is explained.  Pt. continues to report weakness in his legs.    Pt. is currently undomiciled as neither parent will allow him to return home.  The attending doctor is refusing writer's request to refer pt. for state placement due to having 9 IPP admissions in the past 10 months and having an ACT team in the community.  Pt will be referred for [facility] housing.  Writer will complete an HRA application.  On this date,  sent an email to Hannah Hewitt at [facility] to inquire about openings.  Writer will follow up.  Pt. is not psychiatrically stable for discharge.

## 2020-07-24 NOTE — PROGRESS NOTE BEHAVIORAL HEALTH - SUMMARY
Patient is a 30 year old male with pph of schizoaffective disorder, nonspecified, recent IPP discharge 7/15. Patient admitted due to suicidal ideation in context of medication non-adherence. Patient denies current plan or intent, continues to endorse vague, passive suicidal ideation. Patient continues to be somatically preoccupied with weakness and remains isolative.    Schizoaffective disorder  -Risperidone 2 mg BID  -Risperdal consta 25 mg (last dose July 8)    Rigidity/EPS  -benztropine 1 mg BID  -benztropine 1 mg PRN, every 12 hours    Tobacco use  -Nicotine gum 4 mg PRN every 2 hours     Metabolic charting  -follow up labs    Acute anxiety  -lorazepam 2 mg PRN Q8H. Patient is a 30 year old male with pph of schizoaffective disorder, nonspecified, recent IPP discharge 7/15. Patient admitted due to vague suicidal ideation in context of medication non-adherence. Patient denies current plan or intent. Patient continues to be somatically preoccupied with weakness and remains isolative.    Schizoaffective disorder  -Risperidone 2 mg BID  -Risperdal consta 25 mg (last dose July 8)    Rigidity/EPS  -benztropine 1 mg BID  -benztropine 1 mg PRN, every 12 hours    Tobacco use  -Nicotine gum 4 mg PRN every 2 hours     Metabolic charting  -follow up labs    Acute anxiety  -lorazepam 2 mg PRN Q8H.

## 2020-07-24 NOTE — PROGRESS NOTE BEHAVIORAL HEALTH - NSBHFUPINTERVALHXFT_PSY_A_CORE
Patient continues to report weakness/fatigue. Also continues to have suicidal ideation without plan and intent, thinks it is better to be dead than alive about 5-10 times per day. Patient has remained in bed for the past few days, leaving room for dinner. Has refused labs and vital signs because he does not think they will help show the cause of his weakness. Patient is kind and curious about treatment team and smiles to jokes. Continues to request assistance beyond what ACT team can provide to help him with his treatment. Patient continues to report weakness/fatigue.  Patient has remained in bed for the past few days, leaving room for dinner. Has refused labs and vital signs because he does not think they will help show the cause of his weakness. Patient is kind and curious about treatment team and smiles to jokes. Pt is requesting help for when he is discharged, and is currently complying with meds and agrees to Consta injection when available

## 2020-07-24 NOTE — PROGRESS NOTE BEHAVIORAL HEALTH - NSBHFUPINTERVALCCFT_PSY_A_CORE
breath sounds normal. No respiratory distress. She has no wheezes. She has no rales. Abdominal: Soft. Bowel sounds are normal. There is tenderness (mild diffuse abdominal cramping, slightly worse in mustapha LQL). There is no rebound and no guarding. Musculoskeletal: She exhibits no edema. Lymphadenopathy:     She has no cervical adenopathy. Neurological: She is alert and oriented to person, place, and time. No cranial nerve deficit. Coordination normal.   Skin: Skin is warm and dry. No rash noted. She is not diaphoretic. No erythema. No pallor. Psychiatric: She has a normal mood and affect. Her behavior is normal. Judgment and thought content normal.   Nursing note and vitals reviewed. Procedures    MDM    ED Course      ED Course        --------------------------------------------- PAST HISTORY ---------------------------------------------  Past Medical History:  has a past medical history of Arthritis; Asthma; COPD (chronic obstructive pulmonary disease) ([facility] [address] Fibromyalgia; Hyperlipidemia; Low BP; and Short-term memory loss. Past Surgical History:  has a past surgical history that includes Abdomen surgery; Rectocele repair; Cholecystectomy; Appendectomy; knee surgery; Hysterectomy; Breast surgery; Endoscopy, colon, diagnostic; Colonoscopy; and Colon surgery. Social History:  reports that she has been smoking. She has a 30.00 pack-year smoking history. She has never used smokeless tobacco. She reports that she does not drink alcohol or use drugs. Family History: family history is not on file. The patients home medications have been reviewed.     Allergies: Codeine    -------------------------------------------------- RESULTS -------------------------------------------------    Lab  Results for orders placed or performed during the hospital encounter of 03/26/18   CBC Auto Differential   Result Value Ref Range    WBC 13.9 (H) 4.5 - 11.5 E9/L    RBC 5.99 (H) 3.50 - 5.50 E12/L    Hemoglobin "tired" 3.5 - 5.2 g/dL    Alkaline Phosphatase 92 35 - 104 U/L    ALT 22 0 - 32 U/L    AST 26 0 - 31 U/L    Total Bilirubin 0.4 0.0 - 1.2 mg/dL    Bilirubin, Direct <0.2 0.0 - 0.3 mg/dL    Bilirubin, Indirect see below 0.0 - 1.0 mg/dL       Radiology  No orders to display       EKG: This EKG is signed and interpreted by me.          ------------------------- NURSING NOTES AND VITALS REVIEWED ---------------------------  Date / Time Roomed:  3/26/2018  8:39 AM  ED Bed Assignment:  10/10    The nursing notes within the ED encounter and vital signs as below have been reviewed. Patient Vitals for the past 24 hrs:   BP Temp Temp src Pulse Resp SpO2 Height Weight   03/26/18 1103 133/79 97.7 °F (36.5 °C) Oral 72 18 97 % - -   03/26/18 0848 (!) 144/71 97.6 °F (36.4 °C) Oral 94 18 97 % 5' 5\" (1.651 m) 207 lb (93.9 kg)       Oxygen Saturation Interpretation: Normal      ------------------------------------------ PROGRESS NOTES ------------------------------------------  Re-evaluation(s):  Patient seen on arrival by Dr Venkat Connors, who states he will admit. Time: 11:00 AM.  Patients symptoms are worsening  Repeat physical examination is not changed. Bentyl ordered. I have spoken with the patient and discussed todays results, in addition to providing specific details for the plan of care and counseling regarding the diagnosis and prognosis. Their questions are answered at this time and they are agreeable with the plan.      --------------------------------- ADDITIONAL PROVIDER NOTES ---------------------------------  Consultations:  Spoke with Dr. Venkat Connors,  They will admit this patient. This patient's ED course included: a personal history and physicial examination and multiple bedside re-evaluations    This patient has remained hemodynamically stable and remained unchanged during their ED course.     Please note that the withdrawal or failure to initiate urgent interventions for this patient would likely result in a life

## 2020-07-24 NOTE — PROGRESS NOTE BEHAVIORAL HEALTH - OTHER
walks with some rigidity in movement perseverates about body weakness that he feels some somatic preoccupation noted without specific complaint

## 2020-07-25 RX ADMIN — RISPERIDONE 2 MILLIGRAM(S): 4 TABLET ORAL at 20:02

## 2020-07-25 RX ADMIN — Medication 1 MILLIGRAM(S): at 08:30

## 2020-07-25 RX ADMIN — Medication 1 MILLIGRAM(S): at 20:02

## 2020-07-25 RX ADMIN — Medication 50 MILLIGRAM(S): at 21:58

## 2020-07-25 RX ADMIN — Medication 2 MILLIGRAM(S): at 21:58

## 2020-07-25 RX ADMIN — RISPERIDONE 2 MILLIGRAM(S): 4 TABLET ORAL at 08:30

## 2020-07-26 RX ADMIN — Medication 1 MILLIGRAM(S): at 20:04

## 2020-07-26 RX ADMIN — Medication 4 MILLIGRAM(S): at 17:56

## 2020-07-26 RX ADMIN — Medication 1 MILLIGRAM(S): at 08:23

## 2020-07-26 RX ADMIN — Medication 2 MILLIGRAM(S): at 23:53

## 2020-07-26 RX ADMIN — RISPERIDONE 2 MILLIGRAM(S): 4 TABLET ORAL at 20:04

## 2020-07-26 RX ADMIN — Medication 4 MILLIGRAM(S): at 21:00

## 2020-07-26 RX ADMIN — Medication 50 MILLIGRAM(S): at 23:52

## 2020-07-26 RX ADMIN — RISPERIDONE 2 MILLIGRAM(S): 4 TABLET ORAL at 08:23

## 2020-07-27 PROCEDURE — 99232 SBSQ HOSP IP/OBS MODERATE 35: CPT | Mod: GC

## 2020-07-27 RX ADMIN — Medication 1 MILLIGRAM(S): at 20:11

## 2020-07-27 RX ADMIN — Medication 2 MILLIGRAM(S): at 22:42

## 2020-07-27 RX ADMIN — Medication 50 MILLIGRAM(S): at 22:42

## 2020-07-27 RX ADMIN — RISPERIDONE 2 MILLIGRAM(S): 4 TABLET ORAL at 08:23

## 2020-07-27 RX ADMIN — Medication 1 MILLIGRAM(S): at 08:23

## 2020-07-27 RX ADMIN — RISPERIDONE 2 MILLIGRAM(S): 4 TABLET ORAL at 20:11

## 2020-07-27 NOTE — CHART NOTE - NSCHARTNOTEFT_GEN_A_CORE
The treatment team met with pt. to review treatment plan, medications and discharge plan.  Pt refused to leave his bed to speak with the treatment team.  He continues to refuse labs and continues to report feelings of weakness.  He refused to engage in any other dialogue with writer.  Writer did attempt to encourage pt. leave his room and attempt to attend at least one group, however, pt. refused.  Writer will continue attempts to encourage pt.    Pt. is currently undomiciled as his parents will not allow him to her return home.  Writer will complete a HRA application to assist pt. in  finding [facility] housing.  The attending doctor is refusing state placement at this time.  Pt. is not psychiatrically stable for discharge at this time.    Mental Status Exam:    Mood-  Depressed    Sleep-  Normal    Appetite-  Normal    Thought Process-  Linear with some preoccupation    Observation-  Routine

## 2020-07-27 NOTE — PROGRESS NOTE BEHAVIORAL HEALTH - OTHER
some somatic preoccupation noted without specific complaint Did not assess some somatic preoccupation noted

## 2020-07-27 NOTE — PROGRESS NOTE BEHAVIORAL HEALTH - SUMMARY
Patient is a 30-year-old male with past psychiatric history of schizoaffective disorder, nonspecified, recent IPP discharge 7/15. Patient admitted due to vague suicidal ideation in context of medication non-adherence. Patient denies current plan or intent. Patient continues to be somatically preoccupied with weakness and remains isolative.    Schizoaffective disorder      - Risperidone 2 mg PO BID      - Risperdal consta 25 mg (last dose July 8)    Rigidity/EPS      - Benztropine 1 mg PO BID      - Benztropine 1 mg PO PRN, Q12H    Tobacco use      - Nicotine gum 4 mg PO PRN Q2H     Metabolic charting      - Follow up labs    Acute anxiety      - Lorazepam 2 mg PO PRN Q8H Patient is a 30-year-old male with past psychiatric history of schizoaffective disorder, nonspecified, recent IPP discharge 7/15. Patient admitted due to vague suicidal ideation in context of medication non-adherence. Patient denies current plan or intent. Patient continues to be somatically preoccupied with weakness and remains isolative, refusing vitals and lab work. Remains compliant with medications.    Schizoaffective disorder      - Risperidone 2 mg PO BID      - Risperdal consta 25 mg (last dose July 8)    Rigidity/EPS      - Benztropine 1 mg PO BID      - Benztropine 1 mg PO PRN, Q12H    Tobacco use      - Nicotine gum 4 mg PO PRN Q2H     Metabolic charting      - Follow up labs    Acute anxiety      - Lorazepam 2 mg PO PRN Q8H

## 2020-07-27 NOTE — PROGRESS NOTE BEHAVIORAL HEALTH - NSBHFUPINTERVALHXFT_PSY_A_CORE
The patient was seen resting The patient was seen resting in bed. Patient continues to describe general fatigue and weakness. He reports having no trouble sleeping throughout the day and night. When asked what his goals of care are, the patient responded "to get better." Patient continues to refuse vitals, but has been compliant with meds. Denies audio hallucinations, or visual hallucinations. Would not discuss suicidality with writer. The patient was seen resting in bed. Patient continues to describe general fatigue and weakness. He states his mood as "fine" or neutral. He reports having no trouble sleeping throughout the day and night. When asked what his goals of care are, the patient responded "to get better." Patient continues to refuse vitals, but has been compliant with meds. Denies audio hallucinations, or visual hallucinations.

## 2020-07-28 RX ADMIN — Medication 1 MILLIGRAM(S): at 20:07

## 2020-07-28 RX ADMIN — Medication 2 MILLIGRAM(S): at 22:40

## 2020-07-28 RX ADMIN — RISPERIDONE 2 MILLIGRAM(S): 4 TABLET ORAL at 20:07

## 2020-07-28 RX ADMIN — Medication 50 MILLIGRAM(S): at 22:40

## 2020-07-28 RX ADMIN — Medication 1 MILLIGRAM(S): at 08:08

## 2020-07-28 RX ADMIN — RISPERIDONE 2 MILLIGRAM(S): 4 TABLET ORAL at 08:08

## 2020-07-28 NOTE — CHART NOTE - NSCHARTNOTEFT_GEN_A_CORE
spoke with pt.'s ACT team worker, [phone], on this date.   requested Joe send any clinical documentation available for pt. in order for writer to complete HRA application.  Joe agreed to send the limited amount of documentation he has available.

## 2020-07-28 NOTE — PROGRESS NOTE BEHAVIORAL HEALTH - NSBHFUPINTERVALHXFT_PSY_A_CORE
On initial interview, the patient was seen resting in bed. Patient continues to describe general fatigue and weakness. He states his mood as "fine" or neutral. He reports waking early, at 5 am, being unable to fall back asleep.     On reassessment this afternoon, the patient was interviewed walking around the unit and eating. He states his mood is "a little better" this afternoon. 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 He continues to refuse morning vitals, but has been compliant with meds. Denies audio hallucinations, or visual hallucinations, or suicidal/homicidal ideation.

## 2020-07-28 NOTE — PROGRESS NOTE BEHAVIORAL HEALTH - SUMMARY
Patient is a 30-year-old male with past psychiatric history of schizoaffective disorder, nonspecified, recent IPP discharge 7/15. Patient admitted due to vague suicidal ideation in context of medication non-adherence. Patient denies current plan or intent. Patient appears brighter today, and has been less isolative. Continues to not receive morning vitals, but remains compliant with medications. The patient states he received risperdal consta on 7/24, and is able to recall his right shoulder was the injection site.      Schizoaffective disorder      - Risperidone 2 mg PO BID      - Risperdal consta 25 mg (last dose July 24)    Rigidity/EPS      - Benztropine 1 mg PO BID      - Benztropine 1 mg PO PRN, Q12H    Tobacco use      - Nicotine gum 4 mg PO PRN Q2H     Metabolic charting      - Follow up labs    Acute anxiety      - Lorazepam 2 mg PO PRN Q8H

## 2020-07-28 NOTE — CHART NOTE - NSCHARTNOTEFT_GEN_A_CORE
Pt is less isolative as day progresses.  No overt delusions or s/h ideation. Pt's mother states he can live with her in [place] on discharge. Will continue risperdal titration as indicated

## 2020-07-29 PROCEDURE — 99232 SBSQ HOSP IP/OBS MODERATE 35: CPT

## 2020-07-29 RX ADMIN — RISPERIDONE 2 MILLIGRAM(S): 4 TABLET ORAL at 20:03

## 2020-07-29 RX ADMIN — Medication 2 MILLIGRAM(S): at 22:27

## 2020-07-29 RX ADMIN — Medication 1 MILLIGRAM(S): at 08:31

## 2020-07-29 RX ADMIN — Medication 50 MILLIGRAM(S): at 22:27

## 2020-07-29 RX ADMIN — RISPERIDONE 2 MILLIGRAM(S): 4 TABLET ORAL at 08:31

## 2020-07-29 RX ADMIN — Medication 1 MILLIGRAM(S): at 20:03

## 2020-07-29 NOTE — PROGRESS NOTE BEHAVIORAL HEALTH - SUMMARY
AB is a 30 yo male with a PMHx of schizoaffective disorder and an IPP discharge from 7/15. Pt is still complaining of generalized weakness. His affect is blunted and his mood subjectively and objectively is depressed. Pt has however been increasing his appetite trying to eat twice a day and drink water and juice. He is also trying to not isolate himself as much by talking to others on the unit however he still admits that he does not want to go to groups. Assessment of his weakness displayed no abnormalities with full range of motion in his joints. Pt however still refuses to walk and he declined VS this morning. He denies any current suicidal ideation or any other psychiatric symptoms. As a result of the mental status exam findings correlating to the full range of motion in his joints, somatic delusions are still notably expressed. He feels his depressed mood will go away once we get confirmation of an official diagnosis for his weakness. AB is a 31 yo male with a PMHx of schizoaffective disorder and an IPP discharge from 7/15. Pt is still complaining of generalized weakness. His affect is blunted and his mood subjectively and objectively is depressed. Pt has however been increasing his appetite trying to eat twice a day and drink water and juice. He is also trying to not isolate himself as much by talking to others on the unit however he still admits that he does not want to go to groups. Assessment of his weakness displayed no abnormalities with full range of motion in his joints. Pt however still refuses to walk and he declined VS this morning. He denies any current suicidal ideation or any other psychiatric symptoms. As a result of the mental status exam findings correlating to the full range of motion in his joints, somatic delusions are still notably expressed. He feels his depressed mood will go away once we get confirmation of an official diagnosis for his weakness.

## 2020-07-29 NOTE — PROGRESS NOTE BEHAVIORAL HEALTH - MODIFICATIONS
seen/discussed with students.  No Baptist preoccupation, but somatic preoccupations persist.  He is more verbal, and can converse and smile appropriately.

## 2020-07-29 NOTE — PROGRESS NOTE BEHAVIORAL HEALTH - NSBHFUPINTERVALHXFT_PSY_A_CORE
Patient is complaining still of the generalized weakness from "his head to his toe." He notes that the weakness throughout the day prevails and that it is contributing to his depressed mood. He refused VS and does not want to walk on the unit today. He is notably anxious in regards to his physical exam findings and diagnostic tests coming back clear as a result that he knows something is wrong with his body and "the tests will never display that." He is currently experiencing no suicidal ideation with an intent or plan and has no complaints of any other psychiatric symptoms. However, he has shown much improvement in regards to his sleeping habits, appetite, and isolative habits. AB discusses how he has been working to eat more throughout the day while doing his best to drink water and juice which he was not doing much before. While he still has much trouble sleeping, his sleep habits have gotten better from his previous nights as a result of not isolating himself as much by talking to patients on the unit. He had a better sleep also after being administered Atarax 50 mg and Ativan 2 mg. and Although, AB makes it apparent that he still does not want to attend groups for it is too much.  When asked about what his current goals were to get better, he notes that he just wants an official diagnosis for the weakness. Patient is complaining still of the generalized weakness from "his head to his toe." He notes that the weakness throughout the day prevails and that it is contributing to his depressed mood. He refused VS and does not want to walk on the unit today. He is notably anxious in regards to his physical exam findings and diagnostic tests coming back clear as a result that he knows something is wrong with his body and "the tests will never display that." He is currently experiencing no suicidal ideation with an intent or plan and has no complaints of any other psychiatric symptoms. However, he has shown much improvement in regards to his sleeping habits, appetite, and isolative habits. AB discusses how he has been working to eat more throughout the day while doing his best to drink water and juice which he was not doing much before. While he still has much trouble sleeping, his sleep habits have gotten better from his previous nights as a result of not isolating himself as much by talking to patients on the unit. He had a better sleep also after being administered Atarax 50 mg and Ativan 2 mg. Although, AB makes it apparent that he still does not want to attend groups for it is too much.  When asked about what his current goals were to get better, he notes that he just wants an official diagnosis for the weakness.

## 2020-07-29 NOTE — CHART NOTE - NSCHARTNOTEFT_GEN_A_CORE
Social Work Note:    Treatment team met with patient to discuss treatment plan, medications and discharge plan.  During the day patient is observed to be isolative to his room.  Patient was educated to the benefits of attending and actively participating in groups that are offered on the unit.  Patient is compliant with treatment and is in good behavioral control. Patient reports that he had a good night sleep and that he feels well but refuses to walk talk or have a cup of tea. Patient presents as negativistic with low mood and little to no motivation. Patient encouraged to actively participate in treatment.         Mental Status Exam:    Mood – Low  Sleep - Fair  Appetite - Good  ADLs - Fair  Thought Process – Linear    Observation – [id number]    No barriers to discharge identified at this time. Plan is for referral to patient’s private community psychiatrist.      At this time patient is not psychiatrically stable for discharge. Social Work Note:    Treatment team met with patient to discuss treatment plan, medications and discharge plan.  During the day patient is observed to be isolative to his room.  Patient was educated to the benefits of attending and actively participating in groups that are offered on the unit.  Patient is compliant with treatment and is in good behavioral control. Patient reports that he had a good night sleep and that he feels well but refuses to walk talk or have a cup of tea. Patient presents as negativistic with low mood and little to no motivation. Patient encouraged to actively participate in treatment.         Mental Status Exam:    Mood – Low  Sleep - Fair  Appetite - Good  ADLs - Fair  Thought Process – Negativistic    Observation – Direct 1:1 observation    No barriers to discharge identified at this time. Plan is for patient to resume treatment with the ACT Team and for patient to return home with his mother. Patient is aware and amenable.  At this time patient is not psychiatrically stable for discharge.

## 2020-07-30 PROCEDURE — 99232 SBSQ HOSP IP/OBS MODERATE 35: CPT | Mod: GC

## 2020-07-30 RX ADMIN — Medication 1 MILLIGRAM(S): at 08:16

## 2020-07-30 RX ADMIN — RISPERIDONE 2 MILLIGRAM(S): 4 TABLET ORAL at 20:29

## 2020-07-30 RX ADMIN — RISPERIDONE 2 MILLIGRAM(S): 4 TABLET ORAL at 08:16

## 2020-07-30 RX ADMIN — Medication 1 MILLIGRAM(S): at 20:29

## 2020-07-30 RX ADMIN — Medication 50 MILLIGRAM(S): at 22:46

## 2020-07-30 RX ADMIN — Medication 2 MILLIGRAM(S): at 22:30

## 2020-07-30 NOTE — PROGRESS NOTE BEHAVIORAL HEALTH - NSBHFUPINTERVALHXFT_PSY_A_CORE
Pt is passively having suicidal thoughts without any intent or plan. Overall, he generally feels depressed and believes that God is angry with him for having these thoughts. Pt continues to complain of generalized weakness "from head to toe" and expressed concerns about his gait, he believes that he is walking on his heels. He had trouble falling asleep last night, but was able to after receiving Ativan 2 mg and Atarax  50 mg. His appetite continues to improve. He denies visual/auditory hallucinations or delusions. Patient seen walking on the unit. He reports passive death wish, stating that he fears God's anger for his feelings of depression and intrusive negative thoughts regarding a prophet. The patient continues to report generalized weakness "from head to toe" and expressed concerns about his gait, he believes that he is walking on his heels. He had trouble falling asleep last night, but was able to after receiving Ativan 2 mg and Atarax  50 mg. His appetite continues to improve. He denies visual/auditory hallucinations or delusions. Patient seen walking on the unit. He reports that he fears God's anger for his feelings of depression and intrusive negative thoughts regarding a prophet. The patient continues to report generalized weakness "from head to toe" and expressed concerns about his gait, he believes that he is walking on his heels. He had trouble falling asleep last night, but was able to after receiving Ativan 2 mg and Atarax  50 mg. His appetite continues to improve. He denies visual/auditory hallucinations or delusions.

## 2020-07-30 NOTE — PROGRESS NOTE BEHAVIORAL HEALTH - SUMMARY
Patient is a 30-year-old male with past psychiatric history of schizoaffective disorder, nonspecified, recent IPP discharge 7/15. Patient admitted due to vague suicidal ideation in context of medication non-adherence. Patient denies current plan or intent.     Schizoaffective disorder      - Risperidone 2 mg PO BID      - Risperdal consta 25 mg (last dose July 24)    Rigidity/EPS      - Benztropine 1 mg PO BID      - Benztropine 1 mg PO PRN, Q12H    Tobacco use      - Nicotine gum 4 mg PO PRN Q2H     Metabolic charting      - Follow up labs    Acute anxiety      - Lorazepam 2 mg PO PRN Q8H. Patient is a 30-year-old male with past psychiatric history of schizoaffective disorder, nonspecified, recent IPP discharge 7/15. Patient admitted due to vague suicidal ideation in context of medication non-adherence.     On evaluation today, the patient reports passive suicidal ideation in context of unwanted "blasphemous" intrusive thoughts concerning Jainism. Patient denies current plan or intent. He has been more active on the unit, and denies current homicidal ideation, audio or visual hallucinations.     Schizoaffective disorder      - Risperidone 2 mg PO BID      - Risperdal consta 25 mg (last dose July 24)    Rigidity/EPS      - Benztropine 1 mg PO BID      - Benztropine 1 mg PO PRN, Q12H    Tobacco use      - Nicotine gum 4 mg PO PRN Q2H     Metabolic charting      - Follow up labs    Acute anxiety      - Lorazepam 2 mg PO PRN Q8H.

## 2020-07-30 NOTE — PROGRESS NOTE BEHAVIORAL HEALTH - MODIFICATIONS
seen/discussed with resident. Pt is out of room more; very pleasant on approach, and without s/h ideation.  Considering anti-obsessional agent to address intrusive thoughts pt has been struggling with

## 2020-07-31 PROCEDURE — 99232 SBSQ HOSP IP/OBS MODERATE 35: CPT | Mod: GC

## 2020-07-31 RX ORDER — FLUVOXAMINE MALEATE 25 MG/1
25 TABLET ORAL DAILY
Refills: 0 | Status: DISCONTINUED | OUTPATIENT
Start: 2020-07-31 | End: 2020-08-03

## 2020-07-31 RX ADMIN — Medication 1 MILLIGRAM(S): at 20:18

## 2020-07-31 RX ADMIN — Medication 2 MILLIGRAM(S): at 21:40

## 2020-07-31 RX ADMIN — Medication 1 MILLIGRAM(S): at 08:03

## 2020-07-31 RX ADMIN — RISPERIDONE 2 MILLIGRAM(S): 4 TABLET ORAL at 08:02

## 2020-07-31 RX ADMIN — RISPERIDONE 2 MILLIGRAM(S): 4 TABLET ORAL at 20:18

## 2020-07-31 RX ADMIN — FLUVOXAMINE MALEATE 25 MILLIGRAM(S): 25 TABLET ORAL at 15:35

## 2020-07-31 RX ADMIN — Medication 50 MILLIGRAM(S): at 21:40

## 2020-07-31 NOTE — PROGRESS NOTE BEHAVIORAL HEALTH - SUMMARY
Patient is a 30-year-old male with past psychiatric history of schizoaffective disorder, nonspecified, recent IPP discharge 7/15. Patient admitted due to vague suicidal ideation in context of medication non-adherence.     On evaluation today, the patient remains in bed, and was somewhat more resistant to interview, stating that he slept poorly last night. He agrees to start Luvox to manage his OCD symptoms, including intrusive Muslim thoughts.     Schizoaffective disorder      - Start Luvox 25 mg PO daily        - Risperidone 2 mg PO BID      - Risperdal consta 25 mg (last dose July 24)    Rigidity/EPS      - Benztropine 1 mg PO BID      - Benztropine 1 mg PO PRN, Q12H    Tobacco use      - Nicotine gum 4 mg PO PRN Q2H     Metabolic charting      - Follow up labs    Acute anxiety      - Lorazepam 2 mg PO PRN Q8H.

## 2020-07-31 NOTE — PROGRESS NOTE BEHAVIORAL HEALTH - MODIFICATIONS
seen/discussed with resident.  No s/h ideation or overt delusions. Will start luvox and monitor response

## 2020-07-31 NOTE — PROGRESS NOTE BEHAVIORAL HEALTH - NSBHFUPINTERVALHXFT_PSY_A_CORE
Patient is a 30-year-old male with past psychiatric history of schizoaffective disorder, nonspecified, recent IPP discharge 7/15. Patient admitted due to vague suicidal ideation in context of medication non-adherence.     On evaluation today, the patient remains in bed, and     Schizoaffective disorder      - Start Luvox 25 mg po daily      - Risperidone 2 mg PO BID      - Risperdal consta 25 mg (last dose July 24)    Rigidity/EPS      - Benztropine 1 mg PO BID      - Benztropine 1 mg PO PRN, Q12H    Tobacco use      - Nicotine gum 4 mg PO PRN Q2H     Metabolic charting      - Follow up labs    Acute anxiety      - Lorazepam 2 mg PO PRN Q8H. The patient was seen resting in bed. He states his mood as "fine," but that he "just want to sleep," due to poor sleep last night. Continues to be compliant with meds. Denies audio hallucinations, or visual hallucinations, or suicidal/homicidal ideation.

## 2020-08-01 RX ADMIN — Medication 1 MILLIGRAM(S): at 08:17

## 2020-08-01 RX ADMIN — RISPERIDONE 2 MILLIGRAM(S): 4 TABLET ORAL at 20:04

## 2020-08-01 RX ADMIN — FLUVOXAMINE MALEATE 25 MILLIGRAM(S): 25 TABLET ORAL at 08:18

## 2020-08-01 RX ADMIN — Medication 1 MILLIGRAM(S): at 20:05

## 2020-08-01 RX ADMIN — RISPERIDONE 2 MILLIGRAM(S): 4 TABLET ORAL at 08:18

## 2020-08-02 RX ADMIN — RISPERIDONE 2 MILLIGRAM(S): 4 TABLET ORAL at 20:35

## 2020-08-02 RX ADMIN — RISPERIDONE 2 MILLIGRAM(S): 4 TABLET ORAL at 08:53

## 2020-08-02 RX ADMIN — Medication 4 MILLIGRAM(S): at 20:49

## 2020-08-02 RX ADMIN — Medication 1 MILLIGRAM(S): at 08:53

## 2020-08-02 RX ADMIN — Medication 4 MILLIGRAM(S): at 22:50

## 2020-08-02 RX ADMIN — Medication 2 MILLIGRAM(S): at 23:59

## 2020-08-02 RX ADMIN — Medication 1 MILLIGRAM(S): at 20:36

## 2020-08-02 RX ADMIN — Medication 50 MILLIGRAM(S): at 00:10

## 2020-08-02 RX ADMIN — FLUVOXAMINE MALEATE 25 MILLIGRAM(S): 25 TABLET ORAL at 08:53

## 2020-08-02 NOTE — PROGRESS NOTE BEHAVIORAL HEALTH - NSBHFUPINTERVALHXFT_PSY_A_CORE
Chart reviewed, nursing report discussed, pt was assessed individually.    Pt appears nervous and scared, sweating, pacing on the unit, following the undersigned and requesting PRN medications. He said he does not need the Ativan right away, but may need it if he cannot fall asleep at 12 midnight. He worried if he gets punished as he does not think in the same way as God does. He denies hearing voices but worries about his thoughts. Pt also concerned that too much meds may damage his brain.     Ativan 2mg po PRN at bedtime is ordered.

## 2020-08-03 PROCEDURE — 99232 SBSQ HOSP IP/OBS MODERATE 35: CPT | Mod: GC

## 2020-08-03 RX ORDER — FLUVOXAMINE MALEATE 25 MG/1
50 TABLET ORAL DAILY
Refills: 0 | Status: DISCONTINUED | OUTPATIENT
Start: 2020-08-03 | End: 2020-08-06

## 2020-08-03 RX ADMIN — Medication 1 MILLIGRAM(S): at 20:13

## 2020-08-03 RX ADMIN — RISPERIDONE 2 MILLIGRAM(S): 4 TABLET ORAL at 20:13

## 2020-08-03 RX ADMIN — Medication 1 MILLIGRAM(S): at 08:26

## 2020-08-03 RX ADMIN — RISPERIDONE 2 MILLIGRAM(S): 4 TABLET ORAL at 08:26

## 2020-08-03 RX ADMIN — FLUVOXAMINE MALEATE 25 MILLIGRAM(S): 25 TABLET ORAL at 08:26

## 2020-08-03 RX ADMIN — Medication 50 MILLIGRAM(S): at 00:28

## 2020-08-03 NOTE — PROGRESS NOTE BEHAVIORAL HEALTH - NSBHFUPINTERVALHXFT_PSY_A_CORE
Pt denies any change in mood with regards to depression since starting Luvox. He denies any adverse reactions. He continues to believe that he will be punished by God for having negative intrusive thoughts. He notes that he wakes up wishing he was not here anymore, but denies any suicidal intent, plan, or attempt. He has complaints of generalized weakness, but notes daily improvement with appetite. The patient was seen resting in bed. Pt denies any change in mood with regards to depression since starting Luvox. He denies any adverse reactions. He continues to believe that he will be punished by God for having negative intrusive thoughts. He notes that he wakes up wishing he was not here anymore, but denies any suicidal intent, plan, or attempt. He has complaints of generalized weakness, but notes daily improvement with appetite.

## 2020-08-03 NOTE — PROGRESS NOTE BEHAVIORAL HEALTH - SUMMARY
Patient is a 30-year-old male with past psychiatric history of schizoaffective disorder, nonspecified, recent IPP discharge 7/15. Patient admitted due to vague suicidal ideation in context of medication non-adherence.     On evaluation today, the patient remains in bed, and was more compliant with an interview, stating that he slept well last night. He was started on Luvox to manage his OCD symptoms, including intrusive Restorationism thoughts, which has not improved at current dosage. Will likely increase Luvox and reassess the patient.     Schizoaffective disorder      - Start Luvox 25 mg PO daily        - Risperidone 2 mg PO BID      - Risperdal consta 25 mg (last dose July 24)    Rigidity/EPS      - Benztropine 1 mg PO BID      - Benztropine 1 mg PO PRN, Q12H    Tobacco use      - Nicotine gum 4 mg PO PRN Q2H     Metabolic charting      - Follow up labs    Acute anxiety      - Lorazepam 2 mg PO PRN Q8H. Patient is a 30-year-old male with past psychiatric history of schizoaffective disorder, nonspecified, recent IPP discharge 7/15. Patient admitted due to vague suicidal ideation in context of medication non-adherence.     On evaluation today, the patient remains in bed, and was more compliant with an interview, stating that he slept well last night. He was started on Luvox to manage his OCD symptoms, including intrusive Protestant thoughts, which has not resolved.     Schizoaffective disorder      - Start Luvox 25 mg PO daily        - Risperidone 2 mg PO BID      - Risperdal consta 25 mg (last dose July 24)    Rigidity/EPS      - Benztropine 1 mg PO BID      - Benztropine 1 mg PO PRN, Q12H    Tobacco use      - Nicotine gum 4 mg PO PRN Q2H     Metabolic charting      - Follow up labs    Acute anxiety      - Lorazepam 2 mg PO PRN Q8H. Patient is a 30-year-old male with past psychiatric history of schizoaffective disorder, nonspecified, recent IPP discharge 7/15. Patient admitted due to vague suicidal ideation in context of medication non-adherence.     On evaluation today, the patient remains in bed, and was more compliant with an interview, stating that he slept well last night. He was started on Luvox to manage his OCD symptoms, including intrusive Uatsdin thoughts, which has not resolved. Luvox to be increased to Luvox 50 mg PO daily tomorrow, 8/4/20    Schizoaffective disorder      - Luvox 50 mg PO daily        - Risperidone 2 mg PO BID      - Risperdal consta 25 mg (last dose July 24)    Rigidity/EPS      - Benztropine 1 mg PO BID      - Benztropine 1 mg PO PRN, Q12H    Tobacco use      - Nicotine gum 4 mg PO PRN Q2H     Metabolic charting      - Follow up labs    Acute anxiety      - Lorazepam 2 mg PO PRN Q8H.

## 2020-08-03 NOTE — CHART NOTE - NSCHARTNOTEFT_GEN_A_CORE
Social Work Note:    Treatment team met with patient to discuss treatment plan, medications and discharge plan.  During the day patient is observed to be isolative to his room.  Patient was educated to the benefits of attending and actively participating in groups that are offered on the unit. Patient negativistic, lacks motivation and drive. Patient is compliant with medications and unit rules. Patient in good behavioral control. Patient denies SI HI and AVH. Pt does not engage with treatment team during assessment. Activities of daily living could use improvement. Discharge plan is for patient to resume services with ACT team and return home to his mothers home. Patient is aware and amenable. At this time patient is not psychiatrically stable for discharge.      Mental Status Exam:    Mood – Low  Sleep - Fair  Appetite - Fair  ADLs - Fair  Thought Process – Linear    Observation – [id number].

## 2020-08-03 NOTE — PROGRESS NOTE BEHAVIORAL HEALTH - NSBHFUPSUICINTERVALFT_PSY_A_CORE
Patient wakes up wishing he was not here.
Thinks about sleeping and not waking up 5-10 times / day.
Pt is having suicidal thoughts and wishes he was not here anymore.

## 2020-08-03 NOTE — PROGRESS NOTE BEHAVIORAL HEALTH - MODIFICATIONS
Pt seen and discussed with resident. He remains isolative but is pleasant on approach.  No Confucianism preoccupations at this time.  Nio s/h ideation

## 2020-08-04 PROCEDURE — 99232 SBSQ HOSP IP/OBS MODERATE 35: CPT | Mod: GC

## 2020-08-04 RX ADMIN — Medication 2 MILLIGRAM(S): at 00:38

## 2020-08-04 RX ADMIN — RISPERIDONE 2 MILLIGRAM(S): 4 TABLET ORAL at 08:54

## 2020-08-04 RX ADMIN — Medication 1 MILLIGRAM(S): at 08:54

## 2020-08-04 RX ADMIN — Medication 1 MILLIGRAM(S): at 21:24

## 2020-08-04 RX ADMIN — Medication 4 MILLIGRAM(S): at 21:11

## 2020-08-04 RX ADMIN — RISPERIDONE 2 MILLIGRAM(S): 4 TABLET ORAL at 21:23

## 2020-08-04 RX ADMIN — FLUVOXAMINE MALEATE 50 MILLIGRAM(S): 25 TABLET ORAL at 08:59

## 2020-08-04 RX ADMIN — Medication 50 MILLIGRAM(S): at 21:23

## 2020-08-04 NOTE — PROGRESS NOTE BEHAVIORAL HEALTH - SUMMARY
Patient is a 30-year-old male with past psychiatric history of schizoaffective disorder, nonspecified, recent IPP discharge 7/15. Patient admitted due to vague suicidal ideation in context of medication non-adherence.     On evaluation today, the patient was seen out on the unit, and appeared brighter in affect. He continues to have intrusive thoughts, but is less religiously preoccupied today, and currently denies suicidal ideation, homicidal ideation, or psychotic symptoms. Continues to refuse morning vitals.     Schizoaffective disorder      - Luvox 50 mg PO daily      - Risperidone 2 mg PO BID      - Risperdal consta 25 mg (last dose July 24)    Rigidity/EPS      - Benztropine 1 mg PO BID      - Benztropine 1 mg PO PRN, Q12H    Tobacco use      - Nicotine gum 4 mg PO PRN Q2H     Metabolic charting      - Follow up labs    Acute anxiety      - Lorazepam 2 mg PO PRN Q8H.

## 2020-08-04 NOTE — PROGRESS NOTE BEHAVIORAL HEALTH - MODIFICATIONS
seen/discussed with resident.  Mood has improved, and pt is more visible on unit and able to engage in friendly conversations with minimal Rastafari preoccupation evident.  No psychosis

## 2020-08-04 NOTE — PROGRESS NOTE BEHAVIORAL HEALTH - NSBHFUPINTERVALHXFT_PSY_A_CORE
On evaluation today, the patient was seen out on the unit, walking. The patient states his mood is "so-so," but that his sleep was improved last night. He reports continued intrusive thoughts, worrying "do you think God is going to  me for my thoughts?," but is less religiously preoccupied today. Currently denies suicidal ideation, homicidal ideation, or psychotic symptoms.

## 2020-08-05 DIAGNOSIS — F25.1 SCHIZOAFFECTIVE DISORDER, DEPRESSIVE TYPE: ICD-10-CM

## 2020-08-05 PROCEDURE — 99231 SBSQ HOSP IP/OBS SF/LOW 25: CPT

## 2020-08-05 RX ADMIN — Medication 2 MILLIGRAM(S): at 02:14

## 2020-08-05 RX ADMIN — Medication 1 MILLIGRAM(S): at 20:33

## 2020-08-05 RX ADMIN — RISPERIDONE 2 MILLIGRAM(S): 4 TABLET ORAL at 20:33

## 2020-08-05 RX ADMIN — Medication 4 MILLIGRAM(S): at 00:44

## 2020-08-05 RX ADMIN — RISPERIDONE 2 MILLIGRAM(S): 4 TABLET ORAL at 08:17

## 2020-08-05 RX ADMIN — Medication 1 MILLIGRAM(S): at 08:17

## 2020-08-05 RX ADMIN — FLUVOXAMINE MALEATE 50 MILLIGRAM(S): 25 TABLET ORAL at 08:37

## 2020-08-05 NOTE — PROGRESS NOTE BEHAVIORAL HEALTH - NSBHFUPINTERVALCCFT_PSY_A_CORE
Pt continues to c/o intrusive thoughts of a Christianity/judgemental nature.  No s/h ideation or overt delusions.  He is out of his room more, and is extremely pleasant on approach.

## 2020-08-05 NOTE — PROGRESS NOTE BEHAVIORAL HEALTH - SUMMARY
Patient is a 30-year-old male with past psychiatric history of schizoaffective disorder, nonspecified, recent IPP discharge 7/15. Patient admitted due to vague suicidal ideation in context of medication non-adherence.     On evaluation today, the patient was seen out on the unit, and upon observation mood appeared improve. Pt smiled during examination. He continues to have intrusive thoughts, but is less religiously preoccupied today, and currently denies suicidal ideation, homicidal ideation, or psychotic symptoms. Continues to refuse morning vitals. Will continue to observe the patient's mood.     Schizoaffective disorder      - Luvox 50 mg PO daily      - Risperidone 2 mg PO BID      - Risperdal consta 25 mg (last dose July 24)    Rigidity/EPS      - Benztropine 1 mg PO BID      - Benztropine 1 mg PO PRN, Q12H    Tobacco use      - Nicotine gum 4 mg PO PRN Q2H     Metabolic charting      - Follow up labs    Acute anxiety      - Lorazepam 2 mg PO PRN Q8H.

## 2020-08-05 NOTE — CHART NOTE - NSCHARTNOTEFT_GEN_A_CORE
Writer spoke with a member of pt.'s ACT team, alexa-[phone].  An updated progress report was given.  Writer will continue to communicate with pt.'s ACT team.

## 2020-08-05 NOTE — PROGRESS NOTE BEHAVIORAL HEALTH - DETAILS
PROCEDURES:  Abdominal myomectomy 23-May-2019 11:50:47  Isi Butts generalized weakness Pain at the sole of right foot

## 2020-08-05 NOTE — CHART NOTE - NSCHARTNOTEFT_GEN_A_CORE
The treatment team met with pt. to review treatment plan, medications and discharge plan.  Pt. continue to reports feeling depressed.  In addition, he continues to present as religiously preoccupied while asking if God will punish him for his thoughts.  Pt. does not engage in any meaningful dialogue with writer in spite of multiple attempts made.  Writer does provide pt. will support and encouragement to continue taking his medication as prescribed.  Writer also praised pt. for decreasing isolation in his room.  Pt. has decreased isolation and frequently paces the unit.  He does not engage in group counseling sessions.  Pt. denies any suicidal or homicidal ideation or any A/V hallucinations at this time.  A message was left (return call) for a Ms. clark-[phone], from pt.'s ACT team.    Once stable for discharge, pt. will be living with his mother and sister in the community.  He will follow up with his ACT team for continued mental health treatment as well.  There are no known discharge barriers at this time.    Mental Status Exam:    Mood- Depressed    Sleep-  Impaired    Appetite-  Normal    ADL's-  Fair    Thought Process-  Impaired Reasoning/Preoccupations/Perseverative    Observation-  Routine    Pt is not psychiatrically stable for discharge at this time.

## 2020-08-05 NOTE — PROGRESS NOTE BEHAVIORAL HEALTH - NSBHFUPINTERVALHXFT_PSY_A_CORE
Upon evaluation today, the patient is ambulating in the unit without any difficulty. He continues to notes that he is depressive and is having intrusive thoughts. He asked "do you think God will punish me for my thoughts?" and "do you think God will forgive me?" several times while walking in the unit. He denies any suicidal ideations, homicidal ideations, or visual/auditory hallucinations. He was given Ativan 2mg last night, which helped him sleep. In addition, he continues to note that he feels generally weak and has mild pain at the sole of his right foot. He denies any trauma or fall last night.

## 2020-08-06 PROCEDURE — 99232 SBSQ HOSP IP/OBS MODERATE 35: CPT | Mod: GC

## 2020-08-06 RX ORDER — FLUVOXAMINE MALEATE 25 MG/1
75 TABLET ORAL DAILY
Refills: 0 | Status: DISCONTINUED | OUTPATIENT
Start: 2020-08-07 | End: 2020-08-11

## 2020-08-06 RX ORDER — RISPERIDONE 4 MG/1
25 TABLET ORAL ONCE
Refills: 0 | Status: COMPLETED | OUTPATIENT
Start: 2020-08-07 | End: 2020-08-07

## 2020-08-06 RX ADMIN — Medication 2 MILLIGRAM(S): at 23:33

## 2020-08-06 RX ADMIN — FLUVOXAMINE MALEATE 50 MILLIGRAM(S): 25 TABLET ORAL at 08:38

## 2020-08-06 RX ADMIN — Medication 50 MILLIGRAM(S): at 23:33

## 2020-08-06 RX ADMIN — RISPERIDONE 2 MILLIGRAM(S): 4 TABLET ORAL at 20:04

## 2020-08-06 RX ADMIN — Medication 1 MILLIGRAM(S): at 20:04

## 2020-08-06 RX ADMIN — Medication 1 MILLIGRAM(S): at 08:35

## 2020-08-06 RX ADMIN — RISPERIDONE 2 MILLIGRAM(S): 4 TABLET ORAL at 08:35

## 2020-08-06 NOTE — PROGRESS NOTE ADULT - SUBJECTIVE AND OBJECTIVE BOX
pt stable alert in NAD  no new complaints    SCHIZOAFFECTIVE DISORDER  ^SCHIZOAFFECTIVE DISORDER  No pertinent family history in first degree relatives  Handoff  MEWS Score  Schizoaffective disorder  Schizophrenia  Schizoaffective disorder  Schizoaffective disorder, depressive type  Schizoaffective disorder, unspecified type  Schizophrenia  Schizoaffective disorder  No significant past surgical history  WEAKNESS  1    HEALTH ISSUES - PROBLEM Dx:  Schizoaffective disorder, depressive type  Schizoaffective disorder, unspecified type  Schizophrenia: Schizophrenia  Schizoaffective disorder        PAST MEDICAL & SURGICAL HISTORY:  Schizoaffective disorder  Schizophrenia  No significant past surgical history    No Known Allergies      FAMILY HISTORY:  No pertinent family history in first degree relatives      benztropine 1 milliGRAM(s) Oral every 12 hours  benztropine 1 milliGRAM(s) Oral every 12 hours PRN  fluvoxaMINE 50 milliGRAM(s) Oral daily  hydrOXYzine hydrochloride 50 milliGRAM(s) Oral at bedtime PRN  LORazepam     Tablet 2 milliGRAM(s) Oral at bedtime PRN  nicotine  Polacrilex Gum 4 milliGRAM(s) Oral every 2 hours PRN  risperiDONE   Tablet 2 milliGRAM(s) Oral every 12 hours      T(C): --  HR: --  BP: --  RR: --  SpO2: --    PE;  general: no acute cahnges in nad    Lungs:    Heart:    EXT:    Neuro:  alertr no deficits                          CAPILLARY BLOOD GLUCOSE
pt stable alert in NAD  no new complaints    SCHIZOAFFECTIVE DISORDER  ^WEAKNESS  No pertinent family history in first degree relatives  Handoff  MEWS Score  Schizoaffective disorder  Schizophrenia  Schizoaffective disorder  Schizoaffective disorder, depressive type  Schizoaffective disorder, unspecified type  Schizophrenia  Schizoaffective disorder  No significant past surgical history  WEAKNESS  1    HEALTH ISSUES - PROBLEM Dx:  Schizoaffective disorder, depressive type  Schizoaffective disorder, unspecified type  Schizophrenia: Schizophrenia  Schizoaffective disorder        PAST MEDICAL & SURGICAL HISTORY:  Schizoaffective disorder  Schizophrenia  No significant past surgical history    No Known Allergies      FAMILY HISTORY:  No pertinent family history in first degree relatives      benztropine 1 milliGRAM(s) Oral every 12 hours  benztropine 1 milliGRAM(s) Oral every 12 hours PRN  LORazepam     Tablet 2 milliGRAM(s) Oral every 8 hours PRN  nicotine  Polacrilex Gum 4 milliGRAM(s) Oral every 2 hours PRN  risperiDONE   Tablet 2 milliGRAM(s) Oral at bedtime      T(C): --  HR: --  BP: --  RR: --  SpO2: --    PE;  general:  no cahnges noted in nad    Lungs:    Heart:    EXT:    Neuro:  alert no deficits                          CAPILLARY BLOOD GLUCOSE
pt stable alert in NAD  no new complaints    SCHIZOAFFECTIVE DISORDER  ^WEAKNESS  No pertinent family history in first degree relatives  Handoff  MEWS Score  Schizoaffective disorder  Schizophrenia  Schizoaffective disorder  Schizoaffective disorder, depressive type  Schizoaffective disorder, unspecified type  Schizophrenia  Schizoaffective disorder  No significant past surgical history  WEAKNESS  1    HEALTH ISSUES - PROBLEM Dx:  Schizoaffective disorder, depressive type  Schizoaffective disorder, unspecified type  Schizophrenia: Schizophrenia  Schizoaffective disorder        PAST MEDICAL & SURGICAL HISTORY:  Schizoaffective disorder  Schizophrenia  No significant past surgical history    No Known Allergies      FAMILY HISTORY:  No pertinent family history in first degree relatives      benztropine 1 milliGRAM(s) Oral every 12 hours  benztropine 1 milliGRAM(s) Oral every 12 hours PRN  hydrOXYzine hydrochloride 50 milliGRAM(s) Oral at bedtime PRN  LORazepam     Tablet 2 milliGRAM(s) Oral every 8 hours PRN  nicotine  Polacrilex Gum 4 milliGRAM(s) Oral every 2 hours PRN  risperiDONE   Tablet 2 milliGRAM(s) Oral every 12 hours  risperiDONE Injectable, Long Acting 25 milliGRAM(s) IntraMuscular once      T(C): --  HR: --  BP: --  RR: --  SpO2: --    PE;  general:  lyin gin bed stasble innad    Lungs:    Heart:    EXT:    Neuro:  alert no deficits                          CAPILLARY BLOOD GLUCOSE
pt stable alert in NAD  no new complaints    SCHIZOAFFECTIVE DISORDER  ^WEAKNESS  No pertinent family history in first degree relatives  Handoff  MEWS Score  Schizoaffective disorder  Schizophrenia  Schizoaffective disorder  Schizoaffective disorder, depressive type  Schizoaffective disorder, unspecified type  Schizophrenia  Schizoaffective disorder  No significant past surgical history  WEAKNESS  1    HEALTH ISSUES - PROBLEM Dx:  Schizoaffective disorder, depressive type  Schizoaffective disorder, unspecified type  Schizophrenia: Schizophrenia  Schizoaffective disorder        PAST MEDICAL & SURGICAL HISTORY:  Schizoaffective disorder  Schizophrenia  No significant past surgical history    No Known Allergies      FAMILY HISTORY:  No pertinent family history in first degree relatives      benztropine 1 milliGRAM(s) Oral every 12 hours  benztropine 1 milliGRAM(s) Oral every 12 hours PRN  hydrOXYzine hydrochloride 50 milliGRAM(s) Oral at bedtime PRN  nicotine  Polacrilex Gum 4 milliGRAM(s) Oral every 2 hours PRN  risperiDONE   Tablet 2 milliGRAM(s) Oral every 12 hours      T(C): 35.6 (07-30-20 @ 06:19), Max: 35.6 (07-30-20 @ 06:19)  HR: 53 (07-30-20 @ 06:19) (53 - 53)  BP: 97/55 (07-30-20 @ 06:19) (97/55 - 97/55)  RR: 18 (07-30-20 @ 06:19) (18 - 18)  SpO2: --    PE;   general:  no new issseus noted in nad    Lungs:    Heart:    EXT:    Neuro:  alert no deficits                          CAPILLARY BLOOD GLUCOSE
pt stable alert in NAD  no new complaints    SCHIZOAFFECTIVE DISORDER  ^WEAKNESS  No pertinent family history in first degree relatives  Handoff  MEWS Score  Schizoaffective disorder  Schizophrenia  Schizoaffective disorder  Schizoaffective disorder, depressive type  Schizoaffective disorder, unspecified type  Schizophrenia  Schizoaffective disorder  No significant past surgical history  WEAKNESS  1    HEALTH ISSUES - PROBLEM Dx:  Schizoaffective disorder, depressive type  Schizoaffective disorder, unspecified type  Schizophrenia: Schizophrenia  Schizoaffective disorder        PAST MEDICAL & SURGICAL HISTORY:  Schizoaffective disorder  Schizophrenia  No significant past surgical history    No Known Allergies      FAMILY HISTORY:  No pertinent family history in first degree relatives      benztropine 1 milliGRAM(s) Oral every 12 hours  benztropine 1 milliGRAM(s) Oral every 12 hours PRN  hydrOXYzine hydrochloride 50 milliGRAM(s) Oral at bedtime PRN  nicotine  Polacrilex Gum 4 milliGRAM(s) Oral every 2 hours PRN  risperiDONE   Tablet 2 milliGRAM(s) Oral every 12 hours  risperiDONE Injectable, Long Acting 25 milliGRAM(s) IntraMuscular once      T(C): 36.2 (07-27-20 @ 15:44), Max: 36.2 (07-27-20 @ 15:44)  HR: 86 (07-27-20 @ 15:44) (86 - 86)  BP: 106/60 (07-27-20 @ 15:44) (106/60 - 106/60)  RR: 18 (07-27-20 @ 15:44) (18 - 18)  SpO2: --    PE;  general:   no changes noted in nad    Lungs:    Heart:    EXT:    Neuro: alert no deficits                          CAPILLARY BLOOD GLUCOSE
pt stable alert in NAD  no new complaints    SCHIZOAFFECTIVE DISORDER  ^WEAKNESS  No pertinent family history in first degree relatives  Handoff  MEWS Score  Schizoaffective disorder  Schizophrenia  Schizoaffective disorder  Schizoaffective disorder, unspecified type  Schizophrenia  Schizoaffective disorder  No significant past surgical history  WEAKNESS  1    HEALTH ISSUES - PROBLEM Dx:  Schizoaffective disorder, unspecified type  Schizophrenia: Schizophrenia  Schizoaffective disorder        PAST MEDICAL & SURGICAL HISTORY:  Schizoaffective disorder  Schizophrenia  No significant past surgical history    No Known Allergies      FAMILY HISTORY:  No pertinent family history in first degree relatives      benztropine 1 milliGRAM(s) Oral every 12 hours  benztropine 1 milliGRAM(s) Oral every 12 hours PRN  LORazepam     Tablet 2 milliGRAM(s) Oral every 8 hours PRN  nicotine  Polacrilex Gum 4 milliGRAM(s) Oral every 2 hours PRN  risperiDONE   Tablet 2 milliGRAM(s) Oral at bedtime      T(C): --  HR: --  BP: --  RR: --  SpO2: --    PE;  general:  no acute changes nteod in nad    Lungs:    Heart:    EXT:    Neuro:  alert no deficits      16.7  48.0  9.72  23  0.9  4.6  105                      CAPILLARY BLOOD GLUCOSE
pt stable alert in NAD  no new complaints    SCHIZOAFFECTIVE DISORDER  ^SCHIZOAFFECTIVE DISORDER  No pertinent family history in first degree relatives  Handoff  MEWS Score  Schizoaffective disorder  Schizophrenia  Schizoaffective disorder  Schizoaffective disorder, depressive type  Schizoaffective disorder, depressive type  Schizoaffective disorder, unspecified type  Schizophrenia  Schizoaffective disorder  No significant past surgical history  WEAKNESS  1    HEALTH ISSUES - PROBLEM Dx:  Schizoaffective disorder, depressive type  Schizoaffective disorder, depressive type  Schizoaffective disorder, unspecified type  Schizophrenia: Schizophrenia  Schizoaffective disorder        PAST MEDICAL & SURGICAL HISTORY:  Schizoaffective disorder  Schizophrenia  No significant past surgical history    No Known Allergies      FAMILY HISTORY:  No pertinent family history in first degree relatives      benztropine 1 milliGRAM(s) Oral every 12 hours  benztropine 1 milliGRAM(s) Oral every 12 hours PRN  fluvoxaMINE 50 milliGRAM(s) Oral daily  hydrOXYzine hydrochloride 50 milliGRAM(s) Oral at bedtime PRN  LORazepam     Tablet 2 milliGRAM(s) Oral at bedtime PRN  nicotine  Polacrilex Gum 4 milliGRAM(s) Oral every 2 hours PRN  risperiDONE   Tablet 2 milliGRAM(s) Oral every 12 hours      T(C): 35.2 (08-05-20 @ 18:00), Max: 35.2 (08-05-20 @ 18:00)  HR: 60 (08-05-20 @ 18:00) (60 - 60)  BP: 109/64 (08-05-20 @ 18:00) (109/64 - 109/64)  RR: 16 (08-05-20 @ 18:00) (16 - 16)  SpO2: --    PE;  general:  no acute cahnges in nad    Lungs:    Heart:    EXT:    Neuro:  alert no deficits                          CAPILLARY BLOOD GLUCOSE

## 2020-08-06 NOTE — PROGRESS NOTE BEHAVIORAL HEALTH - MODIFICATIONS
seen/discussed with resident.  Less preoccupied with Anabaptist material, and pleasant on approach.  Continue luvox titration; consta as scheduled.

## 2020-08-06 NOTE — PROGRESS NOTE ADULT - ASSESSMENT
medically stable with no acute issues

## 2020-08-06 NOTE — PROGRESS NOTE BEHAVIORAL HEALTH - SUMMARY
Summary (include case differential, formulation and patient response to therapy): Patient is a 30-year-old male with past psychiatric history of schizoaffective disorder, nonspecified, recent IPP discharge 7/15. Patient admitted due to vague suicidal ideation in context of medication non-adherence.     Plan to increase Luvox to Luvox 75 mg PO daily tomorrow, 8/7/20. Plan to administer Risperdal consta 25 mg long-acting injectable tomorrow, 8/7/20.    Schizoaffective disorder      - Up titrate to Luvox 75 mg PO daily tomorrow, 8/7/20      - Risperidone 2 mg PO BID      - Risperdal consta 25 mg long-acting injectable, order submitted for tomorrow, 8/7/20 (last dose July 24)    Rigidity/EPS      - Benztropine 1 mg PO BID      - Benztropine 1 mg PO PRN, Q12H    Tobacco use      - Nicotine gum 4 mg PO PRN Q2H     Metabolic charting      - Follow up labs    Acute anxiety      - Lorazepam 2 mg PO PRN Q8H. Summary (include case differential, formulation and patient response to therapy): Patient is a 30-year-old male with past psychiatric history of schizoaffective disorder, nonspecified, recent IPP discharge 7/15. Patient admitted due to vague suicidal ideation in context of medication non-adherence.     Patient appears to have brighter affect, but continues to struggle with intrusive, Mormonism thoughts. Plan to increase Luvox to Luvox 75 mg PO daily tomorrow, 8/7/20. Plan to administer Risperdal consta 25 mg long-acting injectable tomorrow, 8/7/20.    Schizoaffective disorder      - Up titrate to Luvox 75 mg PO daily tomorrow, 8/7/20      - Risperidone 2 mg PO BID      - Risperdal consta 25 mg long-acting injectable, order submitted for tomorrow, 8/7/20 (last dose July 24)    Rigidity/EPS      - Benztropine 1 mg PO BID      - Benztropine 1 mg PO PRN, Q12H    Tobacco use      - Nicotine gum 4 mg PO PRN Q2H     Metabolic charting      - Follow up labs    Acute anxiety      - Lorazepam 2 mg PO PRN Q8H.

## 2020-08-06 NOTE — PROGRESS NOTE ADULT - REASON FOR ADMISSION
30 YEARS OLD MALE COME FOR PSYCHIATRIC EVALUATION .

## 2020-08-06 NOTE — PROGRESS NOTE BEHAVIORAL HEALTH - NSBHFUPINTERVALHXFT_PSY_A_CORE
The patient was seen resting in bed. He reports his mood as being "better" today, and reports his intrusive thoughts of a Quaker/judgmental nature "may have been better" yesterday. Continues to be pleasant on approach. Did not receive PRN for sleep last night. The patient was seen resting in bed. He reports his mood as being "better" today, and reports his intrusive thoughts of a Jewish/judgmental nature "may have been better" yesterday. Endorses passive death wish today, but denies active suicidal or homicidal ideation. States that intrusive thoughts have been "a little worse" today. Continues to be pleasant on approach. Did not receive PRN for sleep last night. Denies audio or visual hallucinations.

## 2020-08-06 NOTE — PROGRESS NOTE ADULT - PROBLEM SELECTOR PROBLEM 1
Schizoaffective disorder, depressive type
Schizoaffective disorder, unspecified type
Schizoaffective disorder, unspecified type
Schizoaffective disorder, depressive type

## 2020-08-07 PROCEDURE — 99232 SBSQ HOSP IP/OBS MODERATE 35: CPT | Mod: GC

## 2020-08-07 RX ADMIN — Medication 1 MILLIGRAM(S): at 08:31

## 2020-08-07 RX ADMIN — Medication 1 MILLIGRAM(S): at 20:31

## 2020-08-07 RX ADMIN — Medication 1 MILLIGRAM(S): at 22:22

## 2020-08-07 RX ADMIN — RISPERIDONE 2 MILLIGRAM(S): 4 TABLET ORAL at 20:32

## 2020-08-07 RX ADMIN — FLUVOXAMINE MALEATE 75 MILLIGRAM(S): 25 TABLET ORAL at 08:32

## 2020-08-07 RX ADMIN — RISPERIDONE 25 MILLIGRAM(S): 4 TABLET ORAL at 08:56

## 2020-08-07 RX ADMIN — RISPERIDONE 2 MILLIGRAM(S): 4 TABLET ORAL at 08:31

## 2020-08-07 RX ADMIN — Medication 1 MILLIGRAM(S): at 20:32

## 2020-08-07 RX ADMIN — Medication 50 MILLIGRAM(S): at 22:22

## 2020-08-07 NOTE — PROGRESS NOTE BEHAVIORAL HEALTH - MODIFICATIONS
seen/discussed with resident.  Less preoccupied with Denominational material, and pleasant on approach.  Continue luvox titration; consta as scheduled. seen/discussed with resident. Pt is pleasant on approach.  Will continue luvox titration and monitor

## 2020-08-07 NOTE — PROGRESS NOTE BEHAVIORAL HEALTH - SUMMARY
Summary (include case differential, formulation and patient response to therapy): Patient is a 30-year-old male with past psychiatric history of schizoaffective disorder, nonspecified, recent IPP discharge 7/15. Patient admitted due to vague suicidal ideation in context of medication non-adherence.     Risperdal consta 25 mg long-acting injectable administered today, 8/7/20.    Schizoaffective disorder      - Luvox 75 mg PO daily      - Risperidone 2 mg PO BID      - Risperdal consta 25 mg long-acting injectable, Received today 8/7/20    Rigidity/EPS      - Benztropine 1 mg PO BID      - Benztropine 1 mg PO PRN, Q12H    Tobacco use      - Nicotine gum 4 mg PO PRN Q2H     Metabolic charting      - Follow up labs    Acute anxiety      - Lorazepam 2 mg PO PRN Q8H. Summary (include case differential, formulation and patient response to therapy): Patient is a 30-year-old male with past psychiatric history of schizoaffective disorder, nonspecified, recent IPP discharge 7/15. Patient admitted due to vague suicidal ideation in context of medication non-adherence.     Patient continues to struggle with intrusive, Hinduism thoughts, occasional passive death wish, and insomnia. Given the patient's frequent use of PRNs for sleep, starting standing lorazepam for sleep. Risperdal consta 25 mg long-acting injectable administered today, 8/7/20.    Schizoaffective disorder      - Luvox 75 mg PO daily      - Risperidone 2 mg PO BID      - Risperdal consta 25 mg long-acting injectable, Received today 8/7/20    Rigidity/EPS      - Benztropine 1 mg PO BID      - Benztropine 1 mg PO PRN, Q12H    Tobacco use      - Nicotine gum 4 mg PO PRN Q2H     Metabolic charting      - Follow up labs    Insomnia:      - Start standing Lorazepam 1 mg PO nightly     Acute anxiety      - Lorazepam 1 mg PO PRN Q8H.

## 2020-08-07 NOTE — PROGRESS NOTE BEHAVIORAL HEALTH - NSBHFUPINTERVALHXFT_PSY_A_CORE
The patient was seen resting in bed. He reports his mood as being "so-so." Denies suicidal or homicidal ideation. States that intrusive thoughts have been "a little better" today. Continues to be pleasant on approach. Denies audio or visual hallucinations.    Per nursing, received PRN ativan last night secondary to insomnia. Continues to refuse morning vitals.

## 2020-08-08 RX ADMIN — RISPERIDONE 2 MILLIGRAM(S): 4 TABLET ORAL at 10:11

## 2020-08-08 RX ADMIN — RISPERIDONE 2 MILLIGRAM(S): 4 TABLET ORAL at 20:18

## 2020-08-08 RX ADMIN — Medication 1 MILLIGRAM(S): at 21:47

## 2020-08-08 RX ADMIN — FLUVOXAMINE MALEATE 75 MILLIGRAM(S): 25 TABLET ORAL at 10:12

## 2020-08-08 RX ADMIN — Medication 1 MILLIGRAM(S): at 20:19

## 2020-08-08 RX ADMIN — Medication 1 MILLIGRAM(S): at 10:11

## 2020-08-08 RX ADMIN — Medication 50 MILLIGRAM(S): at 21:47

## 2020-08-08 RX ADMIN — Medication 1 MILLIGRAM(S): at 20:18

## 2020-08-09 RX ADMIN — Medication 1 MILLIGRAM(S): at 09:21

## 2020-08-09 RX ADMIN — RISPERIDONE 2 MILLIGRAM(S): 4 TABLET ORAL at 09:21

## 2020-08-09 RX ADMIN — FLUVOXAMINE MALEATE 75 MILLIGRAM(S): 25 TABLET ORAL at 09:22

## 2020-08-09 RX ADMIN — Medication 1 MILLIGRAM(S): at 22:13

## 2020-08-09 RX ADMIN — RISPERIDONE 2 MILLIGRAM(S): 4 TABLET ORAL at 22:13

## 2020-08-09 RX ADMIN — Medication 50 MILLIGRAM(S): at 22:16

## 2020-08-10 PROCEDURE — 99232 SBSQ HOSP IP/OBS MODERATE 35: CPT | Mod: GC

## 2020-08-10 RX ADMIN — RISPERIDONE 2 MILLIGRAM(S): 4 TABLET ORAL at 20:10

## 2020-08-10 RX ADMIN — Medication 1 MILLIGRAM(S): at 23:19

## 2020-08-10 RX ADMIN — Medication 1 MILLIGRAM(S): at 20:12

## 2020-08-10 RX ADMIN — Medication 1 MILLIGRAM(S): at 20:10

## 2020-08-10 RX ADMIN — FLUVOXAMINE MALEATE 75 MILLIGRAM(S): 25 TABLET ORAL at 08:20

## 2020-08-10 RX ADMIN — Medication 1 MILLIGRAM(S): at 08:20

## 2020-08-10 RX ADMIN — RISPERIDONE 2 MILLIGRAM(S): 4 TABLET ORAL at 08:20

## 2020-08-10 RX ADMIN — Medication 50 MILLIGRAM(S): at 23:18

## 2020-08-10 NOTE — PROGRESS NOTE BEHAVIORAL HEALTH - NSBHFUPINTERVALHXFT_PSY_A_CORE
The patient was seen in bed today, minimally cooperative with interview. He states that this intrusive thoughts have improved, and he has no current desire to hurt himself. He stat The patient was seen in bed today, minimally cooperative with interview. He states that this intrusive thoughts have improved, and he has no current desire to hurt himself. He continues to have concerns about heaviness in his body. The patient was encouraged to reach out to his family, to which the patient replied that he hadn't because he "doesn't want to argue" with them, even though "they are always right." He reports that he did not take medications outside of the hospital in the past because they "didn't' work," but that the current medications "are working, so I should probably keep taking them."    Spoke with patient's father, who states the patient is free to stay with him or his mother on discharge. Patient's sister confirmed that patient may return to stay with his mother as well, and that his mother would prefer he stay with her due to his father's health complications.

## 2020-08-10 NOTE — PROGRESS NOTE BEHAVIORAL HEALTH - SUMMARY
Summary (include case differential, formulation and patient response to therapy): Patient is a 30-year-old male with past psychiatric history of schizoaffective disorder, nonspecified, recent IPP discharge 7/15. Patient admitted due to vague suicidal ideation in context of medication non-adherence.     On evaluation today, the patient reports improvement with intrusive thoughts and mood. He denies suicidal or homicidal ideation, or auditory/command or visual hallucinations. Continues to be isolative. Resistant to the idea of speaking to family.     Schizoaffective disorder      - Luvox 75 mg PO daily      - Risperidone 2 mg PO BID      - Risperdal consta 25 mg long-acting injectable, last dose 8/7/20    Rigidity/EPS      - Benztropine 1 mg PO BID      - Benztropine 1 mg PO PRN, Q12H    Tobacco use      - Nicotine gum 4 mg PO PRN Q2H     Metabolic charting      - Follow up labs    Insomnia:      - Start standing Lorazepam 1 mg PO nightly     Acute anxiety      - Lorazepam 1 mg PO PRN Q8H.

## 2020-08-11 PROCEDURE — 99232 SBSQ HOSP IP/OBS MODERATE 35: CPT | Mod: GC

## 2020-08-11 RX ORDER — FLUVOXAMINE MALEATE 25 MG/1
100 TABLET ORAL DAILY
Refills: 0 | Status: DISCONTINUED | OUTPATIENT
Start: 2020-08-11 | End: 2020-08-31

## 2020-08-11 RX ADMIN — Medication 1 MILLIGRAM(S): at 20:28

## 2020-08-11 RX ADMIN — RISPERIDONE 2 MILLIGRAM(S): 4 TABLET ORAL at 20:27

## 2020-08-11 RX ADMIN — Medication 1 MILLIGRAM(S): at 09:27

## 2020-08-11 RX ADMIN — Medication 1 MILLIGRAM(S): at 20:27

## 2020-08-11 RX ADMIN — FLUVOXAMINE MALEATE 100 MILLIGRAM(S): 25 TABLET ORAL at 20:29

## 2020-08-11 RX ADMIN — Medication 50 MILLIGRAM(S): at 22:28

## 2020-08-11 RX ADMIN — FLUVOXAMINE MALEATE 75 MILLIGRAM(S): 25 TABLET ORAL at 09:27

## 2020-08-11 RX ADMIN — RISPERIDONE 2 MILLIGRAM(S): 4 TABLET ORAL at 09:27

## 2020-08-11 NOTE — PROGRESS NOTE BEHAVIORAL HEALTH - SUMMARY
Summary (include case differential, formulation and patient response to therapy): Patient is a 30-year-old male with past psychiatric history of schizoaffective disorder, nonspecified, recent IPP discharge 7/15. Patient admitted due to vague suicidal ideation in context of medication non-adherence.     On evaluation today, the patient reports that intrusive thoughts have remained the same as yesterday. He denies suicidal or homicidal ideation, or auditory/command or visual hallucinations. Continues to be isolative, and resistant to communication with friends or family. Plan to up-titrate Luvox from 75 mg PO daily to 100 mg PO daily tomorrow.     Schizoaffective disorder      - Luvox 100 mg PO daily      - Risperidone 2 mg PO BID      - Risperdal consta 25 mg long-acting injectable, last dose 8/7/20    Rigidity/EPS      - Benztropine 1 mg PO BID      - Benztropine 1 mg PO PRN, Q12H    Tobacco use      - Nicotine gum 4 mg PO PRN Q2H     Metabolic charting      - Follow up labs    Insomnia:      - Start standing Lorazepam 1 mg PO nightly     Acute anxiety      - Lorazepam 1 mg PO PRN Q8H.

## 2020-08-11 NOTE — PROGRESS NOTE BEHAVIORAL HEALTH - NSBHFUPINTERVALHXFT_PSY_A_CORE
The patient was seen in bed today. He has continued to attempt sleep in the day time, then walking in the late afternoon into the evenings. He states that intrusive thoughts are the same as yesterday. denies suicidal ideation. Denies hearing voices or current intrusive thoughts. Discharge was discussed with patient, who states he does not feel well enough to go home, but endorses that he has improved since admission. The patient was seen in bed today. He has continued to attempt sleep in the day time, then walking in the late afternoon into the evenings. He states that intrusive thoughts are the same as yesterday. Denies suicidal or homicidal ideation. Denies hearing voices or current intrusive thoughts. Discharge was discussed with patient, who states he does not feel well enough to go home, but endorses that he has improved since admission.

## 2020-08-11 NOTE — PROGRESS NOTE BEHAVIORAL HEALTH - MODIFICATIONS
seen/discussed with resident.  Mood pleasant and pt is out of room more today.  he says he will be ready to go home next week. Will continue luvox titration

## 2020-08-12 PROCEDURE — 99231 SBSQ HOSP IP/OBS SF/LOW 25: CPT

## 2020-08-12 RX ADMIN — Medication 1 MILLIGRAM(S): at 21:14

## 2020-08-12 RX ADMIN — RISPERIDONE 2 MILLIGRAM(S): 4 TABLET ORAL at 21:12

## 2020-08-12 RX ADMIN — RISPERIDONE 2 MILLIGRAM(S): 4 TABLET ORAL at 10:16

## 2020-08-12 RX ADMIN — Medication 1 MILLIGRAM(S): at 10:17

## 2020-08-12 RX ADMIN — FLUVOXAMINE MALEATE 100 MILLIGRAM(S): 25 TABLET ORAL at 10:17

## 2020-08-12 RX ADMIN — Medication 1 MILLIGRAM(S): at 21:12

## 2020-08-12 NOTE — PROGRESS NOTE BEHAVIORAL HEALTH - NSBHFUPINTERVALCCFT_PSY_A_CORE
Pt is without c/o of Scientologist preoccupation.  No s/h ideation; feels he will be ready to return home next week.  No overt delusions

## 2020-08-12 NOTE — CHART NOTE - NSCHARTNOTEFT_GEN_A_CORE
The treatment team met with pt. to review treatment plan, medications and discharge plan.  Pt. reports is he is still having intrusive thoughts, however, they are not as severe as they have been.  He states he does not feel well enough to be discharged at this time.  He was unable to explain why when writer asked pt. to elaborate.  Pt. continues to minimally engage with writer.  He continues to present with some Mosque preoccupation and remain largely isolative to his room.  Writer continues to encourage pt. to decrease isolation and to engage in some of the unit activities.  Pt. denies any suicidal or homicidal ideation or any A/V hallucinations at this time.  He remains compliant with his medication.    Once stable for discharge, pt. will go to his mother's home in the community.  He will follow up with his ACT Team [facility] at the [facility].  There are no known discharge barriers at this time.      Mental Status Exam:    Mood-  Depressed    Sleep-  Normal    Appetite-  Normal    ADL's-  Poor/Fair    Thought Process-  Linear    Observation-  Routine    Pt. is not psychiatrically stable for discharge at this time.

## 2020-08-13 PROCEDURE — 99231 SBSQ HOSP IP/OBS SF/LOW 25: CPT

## 2020-08-13 RX ADMIN — RISPERIDONE 2 MILLIGRAM(S): 4 TABLET ORAL at 08:24

## 2020-08-13 RX ADMIN — Medication 1 MILLIGRAM(S): at 20:18

## 2020-08-13 RX ADMIN — FLUVOXAMINE MALEATE 100 MILLIGRAM(S): 25 TABLET ORAL at 08:24

## 2020-08-13 RX ADMIN — Medication 1 MILLIGRAM(S): at 20:19

## 2020-08-13 RX ADMIN — RISPERIDONE 2 MILLIGRAM(S): 4 TABLET ORAL at 20:18

## 2020-08-13 RX ADMIN — Medication 1 MILLIGRAM(S): at 08:24

## 2020-08-13 NOTE — PROGRESS NOTE BEHAVIORAL HEALTH - MODIFICATIONS
seen/discussed with resident. No s/h ideation or psychosis evident.  Will continue meds; luvox titration and probable return to family next week

## 2020-08-13 NOTE — PROGRESS NOTE BEHAVIORAL HEALTH - SUMMARY
Summary (include case differential, formulation and patient response to therapy): Patient is a 30-year-old male with past psychiatric history of schizoaffective disorder, nonspecified, recent IPP discharge 7/15. Patient admitted due to vague suicidal ideation in context of medication non-adherence.     On evaluation today, the patient's Worship preoccupation and intrusive thoughts are improved. He denies suicidal or homicidal ideation, or auditory/command or visual hallucinations.      Schizoaffective disorder      - Luvox 100 mg PO daily      - Risperidone 2 mg PO BID      - Risperdal consta 25 mg long-acting injectable, last dose 8/7/20    Rigidity/EPS      - Benztropine 1 mg PO BID      - Benztropine 1 mg PO PRN, Q12H    Tobacco use      - Nicotine gum 4 mg PO PRN Q2H     Metabolic charting      - Follow up labs    Insomnia:      - Lorazepam 1 mg PO nightly     Acute anxiety      - Lorazepam 1 mg PO PRN Q8H. Summary (include case differential, formulation and patient response to therapy): Patient is a 30-year-old male with past psychiatric history of schizoaffective disorder, nonspecified, recent IPP discharge 7/15. Patient admitted due to vague suicidal ideation in context of medication non-adherence.     On evaluation today, the patient's Hoahaoism preoccupation and intrusive thoughts are not evident. suicidal or homicidal ideation, or auditory/command or visual hallucinations, or delusions, are evident.       Schizoaffective disorder      - Luvox 100 mg PO daily      - Risperidone 2 mg PO BID      - Risperdal consta 25 mg long-acting injectable, last dose 8/7/20    Rigidity/EPS      - Benztropine 1 mg PO BID      - Benztropine 1 mg PO PRN, Q12H    Tobacco use      - Nicotine gum 4 mg PO PRN Q2H     Metabolic charting      - Follow up labs    Insomnia:      - Lorazepam 1 mg PO nightly     Acute anxiety      - Lorazepam 1 mg PO PRN Q8H.

## 2020-08-14 PROCEDURE — 99231 SBSQ HOSP IP/OBS SF/LOW 25: CPT

## 2020-08-14 RX ADMIN — Medication 1 MILLIGRAM(S): at 23:56

## 2020-08-14 RX ADMIN — RISPERIDONE 2 MILLIGRAM(S): 4 TABLET ORAL at 21:11

## 2020-08-14 RX ADMIN — FLUVOXAMINE MALEATE 100 MILLIGRAM(S): 25 TABLET ORAL at 08:26

## 2020-08-14 RX ADMIN — Medication 1 MILLIGRAM(S): at 08:25

## 2020-08-14 RX ADMIN — RISPERIDONE 2 MILLIGRAM(S): 4 TABLET ORAL at 08:25

## 2020-08-14 RX ADMIN — Medication 50 MILLIGRAM(S): at 00:10

## 2020-08-14 RX ADMIN — Medication 4 MILLIGRAM(S): at 23:20

## 2020-08-14 RX ADMIN — Medication 1 MILLIGRAM(S): at 21:11

## 2020-08-14 RX ADMIN — Medication 1 MILLIGRAM(S): at 00:10

## 2020-08-14 NOTE — PROGRESS NOTE BEHAVIORAL HEALTH - NSBHFUPINTERVALHXFT_PSY_A_CORE
Patient seen on rounds, alert in bed. He endorses improved sleep last night, despite use of PRN lorazepam 1 mg PO at midnight. He describes his mood and energy levels as "better," and reports continued improvement in intrusive thoughts. He denies suicidal/homicidal ideation, and visual/audio hallucinations. Patient was encouraged to attend groups and be more active on the unit.

## 2020-08-14 NOTE — PROGRESS NOTE BEHAVIORAL HEALTH - MODIFICATIONS
seen/discussed with resident.  Mood pleasant; gradual improvement noted. Probable d/c next week to home.

## 2020-08-14 NOTE — CHART NOTE - NSCHARTNOTEFT_GEN_A_CORE
The treatment team met with pt. to review treatment plan, medications and discharge plan.  Pt. reports his mood has improved.  He also is reporting less intrusive thoughts.  However, pt. continues to remain largely isolative to his room.  Writer continues to encourage pt. to decrease isolation and attempt to engage in unit activities.  Pt. minimally engages with the treatment team and frequently reports feeling tired.  He remains compliant with his medication. Pt. denies any suicidal or homicidal ideations or any A/V hallucinations.    Once stable for discharge, pt. will return to his mother's home.  Pt. will also follow up with his ACT team in the community.  A message was left today for a member of his ACT team, Ms. [phone].  A return call was requested and an update on pt.'s progress was left on her voicemail.  There are no known discharge barriers at this time.    Mental Status Exam:    Mood-  Euthymic    Sleep-  Normal    Appetite-  Normal    ADL's-  Fair    Thought Process-  Poverty of thought    Observation-  Routine    Pt. is not yet stable for discharge at this time.

## 2020-08-14 NOTE — PROGRESS NOTE BEHAVIORAL HEALTH - SUMMARY
Summary (include case differential, formulation and patient response to therapy): Patient is a 30-year-old male with past psychiatric history of schizoaffective disorder, nonspecified, recent IPP discharge 7/15. Patient admitted due to vague suicidal ideation in context of medication non-adherence.     On evaluation today, the patient's Mormon preoccupation and intrusive thoughts are not evident. suicidal or homicidal ideation, or auditory/command or visual hallucinations, or delusions, are evident.       Schizoaffective disorder      - Luvox 100 mg PO daily      - Risperidone 2 mg PO BID      - Risperdal consta 25 mg long-acting injectable, last dose 8/7/20    Rigidity/EPS      - Benztropine 1 mg PO BID      - Benztropine 1 mg PO PRN, Q12H    Tobacco use      - Nicotine gum 4 mg PO PRN Q2H     Metabolic charting      - Follow up labs    Insomnia:      - Lorazepam 1 mg PO nightly     Acute anxiety      - Lorazepam 1 mg PO PRN Q8H.

## 2020-08-15 RX ADMIN — Medication 4 MILLIGRAM(S): at 23:03

## 2020-08-15 RX ADMIN — Medication 1 MILLIGRAM(S): at 08:02

## 2020-08-15 RX ADMIN — Medication 1 MILLIGRAM(S): at 21:01

## 2020-08-15 RX ADMIN — RISPERIDONE 2 MILLIGRAM(S): 4 TABLET ORAL at 21:01

## 2020-08-15 RX ADMIN — RISPERIDONE 2 MILLIGRAM(S): 4 TABLET ORAL at 08:02

## 2020-08-15 RX ADMIN — Medication 50 MILLIGRAM(S): at 23:02

## 2020-08-15 RX ADMIN — Medication 1 MILLIGRAM(S): at 23:02

## 2020-08-15 RX ADMIN — FLUVOXAMINE MALEATE 100 MILLIGRAM(S): 25 TABLET ORAL at 08:02

## 2020-08-16 RX ADMIN — Medication 1 MILLIGRAM(S): at 21:02

## 2020-08-16 RX ADMIN — RISPERIDONE 2 MILLIGRAM(S): 4 TABLET ORAL at 21:02

## 2020-08-16 RX ADMIN — RISPERIDONE 2 MILLIGRAM(S): 4 TABLET ORAL at 08:49

## 2020-08-16 RX ADMIN — FLUVOXAMINE MALEATE 100 MILLIGRAM(S): 25 TABLET ORAL at 08:49

## 2020-08-16 RX ADMIN — Medication 1 MILLIGRAM(S): at 08:49

## 2020-08-17 PROCEDURE — 99231 SBSQ HOSP IP/OBS SF/LOW 25: CPT

## 2020-08-17 RX ORDER — FLUVOXAMINE MALEATE 25 MG/1
50 TABLET ORAL AT BEDTIME
Refills: 0 | Status: DISCONTINUED | OUTPATIENT
Start: 2020-08-17 | End: 2020-08-31

## 2020-08-17 RX ADMIN — Medication 1 MILLIGRAM(S): at 21:02

## 2020-08-17 RX ADMIN — RISPERIDONE 2 MILLIGRAM(S): 4 TABLET ORAL at 08:04

## 2020-08-17 RX ADMIN — Medication 1 MILLIGRAM(S): at 08:04

## 2020-08-17 RX ADMIN — FLUVOXAMINE MALEATE 50 MILLIGRAM(S): 25 TABLET ORAL at 21:08

## 2020-08-17 RX ADMIN — RISPERIDONE 2 MILLIGRAM(S): 4 TABLET ORAL at 21:02

## 2020-08-17 RX ADMIN — FLUVOXAMINE MALEATE 100 MILLIGRAM(S): 25 TABLET ORAL at 08:05

## 2020-08-17 NOTE — PROGRESS NOTE BEHAVIORAL HEALTH - SUMMARY
Summary (include case differential, formulation and patient response to therapy): Patient is a 30-year-old male with past psychiatric history of schizoaffective disorder, nonspecified, recent IPP discharge 7/15. Patient admitted due to vague suicidal ideation in context of medication non-adherence.     Increase Luvox from 100 mg PO daily to Luvox 100 mg PO morning, Luvox 50 mg PO nightly    Schizoaffective disorder      - Luvox 100 mg PO morning, Luvox 50 mg PO nightly      - Risperidone 2 mg PO BID      - Risperdal consta 25 mg long-acting injectable, last dose 8/7/20; next dose 8/21/20    Rigidity/EPS      - Benztropine 1 mg PO BID      - Benztropine 1 mg PO PRN, Q12H    Tobacco use      - Nicotine gum 4 mg PO PRN Q2H     Metabolic charting      - Follow up labs    Insomnia:      - Lorazepam 1 mg PO nightly     Acute anxiety      - Lorazepam 1 mg PO PRN Q8H.

## 2020-08-17 NOTE — CHART NOTE - NSCHARTNOTEFT_GEN_A_CORE
The treatment team met with pt. to review treatment plan, medications and discharge plan.  Pt. continues to reports some intrusive thoughts.  He is not reporting any Judaism preoccupation at this time. Pt. continues to remain isolative to his room.  Writer continues to encourage pt. to decrease isolation and engage in unit activities.  Writer reviewed the benefits of doing so.  Pt. refused to engage further with writer and stated he was tired and wanted to go back to sleep.  Pt. denies any suicidal or homicidal ideation or any A/V hallucinations.  He remains compliant with his medication.  Writer spoke with pt.'s ACT team on this date.    Once stable for discharge, pt. will return to his mother's home in the community.  He will continue mental health treatment with his ACT team in the community.  There are no known discharge barriers at this time.    Mental Status Exam:    Mood-  Depressed    Sleep-  Normal    Appetite-  Normal    ADL's-  Fair    Thought Process-  Intrusive thoughts    Observation- Routine    Pt. is not yet stable for discharge at this time.

## 2020-08-17 NOTE — PROGRESS NOTE BEHAVIORAL HEALTH - NSBHFUPINTERVALHXFT_PSY_A_CORE
Patient seen in bed after declining to attend treatment team. Patient states he was up most of the night and just wants to rest today. He denies intrusive thoughts, hearing voices that are not there, or suicidal/homicidal ideation. Encouraged to attend groups, be active on the unit.

## 2020-08-17 NOTE — PROGRESS NOTE BEHAVIORAL HEALTH - NSBHCHARTREVIEWINVESTIGATE_PSY_A_CORE FT
< from: 12 Lead ECG (07.18.20 @ 18:41) >  Ventricular Rate 110 BPM  Atrial Rate 110 BPM  P-R Interval 134 ms  QRS Duration 70 ms  Q-T Interval 308 ms  QTC Calculation(Bezet) 416 ms
< from: 12 Lead ECG (07.18.20 @ 18:41) >        < end of copied text >
< from: 12 Lead ECG (07.18.20 @ 18:41) >  Ventricular Rate 110 BPM  Atrial Rate 110 BPM  P-R Interval 134 ms  QRS Duration 70 ms  Q-T Interval 308 ms  QTC Calculation(Bezet) 416 ms

## 2020-08-17 NOTE — PROGRESS NOTE BEHAVIORAL HEALTH - NSBHCHARTREVIEWVS_PSY_A_CORE FT
Vital Signs Last 24 Hrs  T(C): 35.2 (05 Aug 2020 18:00), Max: 35.2 (05 Aug 2020 18:00)  T(F): 95.3 (05 Aug 2020 18:00), Max: 95.3 (05 Aug 2020 18:00)  HR: 60 (05 Aug 2020 18:00) (60 - 60)  BP: 109/64 (05 Aug 2020 18:00) (109/64 - 109/64)  BP(mean): --  RR: 16 (05 Aug 2020 18:00) (16 - 16)  SpO2: --
Vital Signs Last 24 Hrs  T(C): 35.6 (30 Jul 2020 06:19), Max: 35.6 (30 Jul 2020 06:19)  T(F): 96 (30 Jul 2020 06:19), Max: 96 (30 Jul 2020 06:19)  HR: 53 (30 Jul 2020 06:19) (53 - 53)  BP: 97/55 (30 Jul 2020 06:19) (97/55 - 97/55)  BP(mean): --  RR: 18 (30 Jul 2020 06:19) (18 - 18)  SpO2: --
Vital Signs Last 24 Hrs  T(C): 36.5 (06 Aug 2020 18:53), Max: 36.5 (06 Aug 2020 18:53)  T(F): 97.7 (06 Aug 2020 18:53), Max: 97.7 (06 Aug 2020 18:53)  HR: 72 (06 Aug 2020 18:53) (72 - 72)  BP: 130/68 (06 Aug 2020 18:53) (130/68 - 130/68)  BP(mean): --  RR: 18 (06 Aug 2020 18:53) (18 - 18)  SpO2: --
Vital Signs Last 24 Hrs  T(C): 36.1 (10 Aug 2020 15:41), Max: 36.1 (10 Aug 2020 15:41)  T(F): 96.9 (10 Aug 2020 15:41), Max: 96.9 (10 Aug 2020 15:41)  HR: 87 (10 Aug 2020 15:41) (87 - 87)  BP: 102/59 (10 Aug 2020 15:41) (102/59 - 102/59)  BP(mean): --  RR: 16 (10 Aug 2020 15:41) (16 - 16)  SpO2: --
Vital Signs Last 24 Hrs  T(C): 36.2 (27 Jul 2020 15:44), Max: 36.2 (27 Jul 2020 15:44)  T(F): 97.1 (27 Jul 2020 15:44), Max: 97.1 (27 Jul 2020 15:44)  HR: 86 (27 Jul 2020 15:44) (86 - 86)  BP: 106/60 (27 Jul 2020 15:44) (106/60 - 106/60)  BP(mean): --  RR: 18 (27 Jul 2020 15:44) (18 - 18)  SpO2: --
Vital Signs Last 24 Hrs  T(C): 36.4 (10 Aug 2020 05:42), Max: 36.4 (09 Aug 2020 15:54)  T(F): 97.5 (10 Aug 2020 05:42), Max: 97.6 (09 Aug 2020 15:54)  HR: 63 (10 Aug 2020 05:42) (63 - 69)  BP: 102/65 (10 Aug 2020 05:42) (96/50 - 102/65)  BP(mean): --  RR: 18 (10 Aug 2020 05:42) (16 - 18)  SpO2: --
Vital Signs Last 24 Hrs  T(C): 36.6 (02 Aug 2020 18:27), Max: 36.6 (02 Aug 2020 18:27)  T(F): 97.8 (02 Aug 2020 18:27), Max: 97.8 (02 Aug 2020 18:27)  HR: 66 (02 Aug 2020 18:27) (66 - 66)  BP: 101/47 (02 Aug 2020 18:27) (101/47 - 101/47)  BP(mean): --  RR: 18 (02 Aug 2020 18:27) (18 - 18)  SpO2: --
Vital Signs Last 24 Hrs  T(C): 36.6 (28 Jul 2020 17:51), Max: 36.6 (28 Jul 2020 17:51)  T(F): 97.8 (28 Jul 2020 17:51), Max: 97.8 (28 Jul 2020 17:51)  HR: 91 (28 Jul 2020 17:51) (91 - 91)  BP: 113/65 (28 Jul 2020 17:51) (113/65 - 113/65)  BP(mean): --  RR: 16 (28 Jul 2020 17:51) (16 - 16)  SpO2: --
Vital Signs Last 24 Hrs  T(C): 36.8 (13 Aug 2020 19:53), Max: 36.8 (13 Aug 2020 19:53)  T(F): 98.2 (13 Aug 2020 19:53), Max: 98.2 (13 Aug 2020 19:53)  HR: 51 (13 Aug 2020 19:53) (51 - 51)  BP: --  BP(mean): --  RR: 18 (13 Aug 2020 19:53) (18 - 18)  SpO2: --
Vital Signs Last 24 Hrs  T(C): 36.2 (19 Jul 2020 15:48), Max: 37.3 (18 Jul 2020 17:21)  T(F): 97.2 (19 Jul 2020 15:48), Max: 99.1 (18 Jul 2020 17:21)  HR: 137 (19 Jul 2020 15:48) (110 - 137)  BP: 120/60 (19 Jul 2020 15:48) (120/60 - 134/87)  BP(mean): --  RR: 16 (19 Jul 2020 15:48) (16 - 18)  SpO2: 96% (18 Jul 2020 17:21) (96% - 96%)
Vital Signs Last 24 Hrs  T(C): 36.4 (12 Aug 2020 16:06), Max: 36.4 (12 Aug 2020 16:06)  T(F): 97.6 (12 Aug 2020 16:06), Max: 97.6 (12 Aug 2020 16:06)  HR: 50 (12 Aug 2020 16:06) (50 - 50)  BP: 93/50 (12 Aug 2020 16:06) (93/50 - 93/50)  BP(mean): --  RR: 16 (12 Aug 2020 16:06) (16 - 16)  SpO2: --
Vital Signs Last 24 Hrs  T(C): 36.2 (04 Aug 2020 16:30), Max: 36.2 (04 Aug 2020 16:30)  T(F): 97.2 (04 Aug 2020 16:30), Max: 97.2 (04 Aug 2020 16:30)  HR: 82 (04 Aug 2020 16:30) (82 - 82)  BP: 116/59 (04 Aug 2020 16:30) (116/59 - 116/59)  BP(mean): --  RR: 18 (04 Aug 2020 16:30) (18 - 18)  SpO2: --
Vital Signs Last 24 Hrs  T(C): --  T(F): --  HR: --  BP: --  BP(mean): --  RR: --  SpO2: --
Vital Signs Last 24 Hrs  T(C): 36.9 (30 Jul 2020 15:46), Max: 36.9 (30 Jul 2020 15:46)  T(F): 98.4 (30 Jul 2020 15:46), Max: 98.4 (30 Jul 2020 15:46)  HR: 80 (30 Jul 2020 15:46) (80 - 80)  BP: 105/75 (30 Jul 2020 15:46) (105/75 - 105/75)  BP(mean): --  RR: 18 (30 Jul 2020 15:46) (18 - 18)  SpO2: --
Vital Signs Last 24 Hrs  T(C): --  T(F): --  HR: --  BP: --  BP(mean): --  RR: --  SpO2: --

## 2020-08-17 NOTE — PROGRESS NOTE BEHAVIORAL HEALTH - NS ED [facility] REVIEW OF ED CHART AVAILABLE LABS REVIEWED
None available
Yes
None available
None available
Yes
Yes
None available

## 2020-08-17 NOTE — PROGRESS NOTE BEHAVIORAL HEALTH - NS ED [facility] REVIEW OF ED CHART VITAL SIGNS REVIEWED
Yes
None available
Yes
None available
Yes

## 2020-08-18 PROCEDURE — 99231 SBSQ HOSP IP/OBS SF/LOW 25: CPT

## 2020-08-18 RX ADMIN — Medication 1 MILLIGRAM(S): at 20:04

## 2020-08-18 RX ADMIN — Medication 1 MILLIGRAM(S): at 08:28

## 2020-08-18 RX ADMIN — RISPERIDONE 2 MILLIGRAM(S): 4 TABLET ORAL at 08:28

## 2020-08-18 RX ADMIN — FLUVOXAMINE MALEATE 50 MILLIGRAM(S): 25 TABLET ORAL at 20:05

## 2020-08-18 RX ADMIN — Medication 1 MILLIGRAM(S): at 20:05

## 2020-08-18 RX ADMIN — FLUVOXAMINE MALEATE 100 MILLIGRAM(S): 25 TABLET ORAL at 08:27

## 2020-08-18 RX ADMIN — RISPERIDONE 2 MILLIGRAM(S): 4 TABLET ORAL at 20:04

## 2020-08-18 NOTE — PROGRESS NOTE BEHAVIORAL HEALTH - MODIFICATIONS
seen/discussed with resident.  He is less intrusive and less religiously preoccupied. Proceeding with d/c planning

## 2020-08-18 NOTE — PROGRESS NOTE BEHAVIORAL HEALTH - NSBHFUPINTERVALHXFT_PSY_A_CORE
The patient was seen on morning rounds, resting in bed. The patient reports not sleeping last night, but reports his mood as "okay."

## 2020-08-19 PROCEDURE — 99231 SBSQ HOSP IP/OBS SF/LOW 25: CPT

## 2020-08-19 RX ADMIN — FLUVOXAMINE MALEATE 100 MILLIGRAM(S): 25 TABLET ORAL at 08:06

## 2020-08-19 RX ADMIN — Medication 1 MILLIGRAM(S): at 08:06

## 2020-08-19 RX ADMIN — Medication 1 MILLIGRAM(S): at 20:29

## 2020-08-19 RX ADMIN — RISPERIDONE 2 MILLIGRAM(S): 4 TABLET ORAL at 08:06

## 2020-08-19 RX ADMIN — FLUVOXAMINE MALEATE 50 MILLIGRAM(S): 25 TABLET ORAL at 20:39

## 2020-08-19 RX ADMIN — RISPERIDONE 2 MILLIGRAM(S): 4 TABLET ORAL at 20:29

## 2020-08-19 NOTE — CHART NOTE - NSCHARTNOTEFT_GEN_A_CORE
The treatment team met with pt. to review treatment plan, medications and discharge plan.  Pt. presented with improved mood.  He was smiling and stood up out of bed for the first time during an interview.  However, pt. continues to remain isolative to his room.  Writer continues to encourage pt. to put on regular clothing and reduce isolation.  Additionally , writer continues to encourage pt. to attend unit activities.  Pt. was educated about the benefits of doing so. He verbalizes some understanding of doing so but does not engage in much meaningful conversation.   Pt. denies any suicidal or homicidal ideations or any A/V hallucinations at this time. He remains compliant with his medications.    Once stable for discharge, pt. will return to his mother's home in the community.  He will follow up with his ACT team as well.  There are no known discharge barriers at this time.    Mental Status Exam:      Mood-  Euthymic    Sleep-  Normal    Appetite-  Normal    ADL's-  Fair    Thought Process-  Intrusive thoughts    Observation-  Routine    Pt. is not yet ready for discharge on this date.

## 2020-08-19 NOTE — PROGRESS NOTE BEHAVIORAL HEALTH - NSBHFUPINTERVALCCFT_PSY_A_CORE
Pt is pleasant on approach.  he was up and around earlier today, and verbal with team during morning rounds. No evidence of psychosis or Anabaptism preoccupation.  No s/h ideation.

## 2020-08-20 PROCEDURE — 99231 SBSQ HOSP IP/OBS SF/LOW 25: CPT

## 2020-08-20 RX ORDER — RISPERIDONE 4 MG/1
25 TABLET ORAL ONCE
Refills: 0 | Status: DISCONTINUED | OUTPATIENT
Start: 2020-08-21 | End: 2020-08-21

## 2020-08-20 RX ADMIN — Medication 1 MILLIGRAM(S): at 08:22

## 2020-08-20 RX ADMIN — FLUVOXAMINE MALEATE 100 MILLIGRAM(S): 25 TABLET ORAL at 08:26

## 2020-08-20 RX ADMIN — RISPERIDONE 2 MILLIGRAM(S): 4 TABLET ORAL at 08:22

## 2020-08-20 RX ADMIN — Medication 1 MILLIGRAM(S): at 20:57

## 2020-08-20 RX ADMIN — Medication 1 MILLIGRAM(S): at 20:58

## 2020-08-20 RX ADMIN — FLUVOXAMINE MALEATE 50 MILLIGRAM(S): 25 TABLET ORAL at 20:58

## 2020-08-20 RX ADMIN — RISPERIDONE 2 MILLIGRAM(S): 4 TABLET ORAL at 20:57

## 2020-08-20 NOTE — PROGRESS NOTE BEHAVIORAL HEALTH - NSBHFUPINTERVALHXFT_PSY_A_CORE
Patient seen walking on the unit. He states his mood is "up and down," but that the intrusive thoughts are much less so. He expresses concern of God's judgment. He denies suicidal ideation, homicidal ideation, hearing voices, or seeing things which may not be there. He expresses concern about outpatient followup, stating "I had trouble with it before."

## 2020-08-20 NOTE — PROGRESS NOTE BEHAVIORAL HEALTH - SUMMARY
Summary (include case differential, formulation and patient response to therapy): Patient is a 30-year-old male with past psychiatric history of schizoaffective disorder, nonspecified, recent IPP discharge 7/15. Patient admitted due to vague suicidal ideation in context of medication non-adherence.     Patient's intrusive thoughts continue to improve. No suicidal ideation, no homicidal ideation, no overt psychosis noted. Thoughts are somewhat religiously perseverative today, but much less so than previous days.      Schizoaffective disorder      - Luvox 100 mg PO morning, Luvox 50 mg PO nightly      - Risperidone 2 mg PO BID      - Risperdal consta 25 mg long-acting injectable, last dose 8/7/20; next dose tomorrow 8/21/20    Rigidity/EPS      - Benztropine 1 mg PO BID      - Benztropine 1 mg PO PRN, Q12H    Tobacco use      - Nicotine gum 4 mg PO PRN Q2H     Metabolic charting      - Follow up labs    Insomnia:      - Lorazepam 1 mg PO nightly     Acute anxiety      - Lorazepam 1 mg PO PRN Q8H.

## 2020-08-21 LAB
A1C WITH ESTIMATED AVERAGE GLUCOSE RESULT: 4.8 % — SIGNIFICANT CHANGE UP (ref 4–5.6)
ALBUMIN SERPL ELPH-MCNC: 4.5 G/DL — SIGNIFICANT CHANGE UP (ref 3.5–5.2)
ALP SERPL-CCNC: 70 U/L — SIGNIFICANT CHANGE UP (ref 30–115)
ALT FLD-CCNC: 13 U/L — SIGNIFICANT CHANGE UP (ref 0–41)
ANION GAP SERPL CALC-SCNC: 13 MMOL/L — SIGNIFICANT CHANGE UP (ref 7–14)
AST SERPL-CCNC: 12 U/L — SIGNIFICANT CHANGE UP (ref 0–41)
BASOPHILS # BLD AUTO: 0.03 K/UL — SIGNIFICANT CHANGE UP (ref 0–0.2)
BASOPHILS NFR BLD AUTO: 0.4 % — SIGNIFICANT CHANGE UP (ref 0–1)
BILIRUB SERPL-MCNC: 0.5 MG/DL — SIGNIFICANT CHANGE UP (ref 0.2–1.2)
BUN SERPL-MCNC: 16 MG/DL — SIGNIFICANT CHANGE UP (ref 10–20)
CALCIUM SERPL-MCNC: 10.6 MG/DL — HIGH (ref 8.5–10.1)
CHLORIDE SERPL-SCNC: 103 MMOL/L — SIGNIFICANT CHANGE UP (ref 98–110)
CHOLEST SERPL-MCNC: 200 MG/DL — SIGNIFICANT CHANGE UP (ref 100–200)
CO2 SERPL-SCNC: 24 MMOL/L — SIGNIFICANT CHANGE UP (ref 17–32)
CREAT SERPL-MCNC: 1 MG/DL — SIGNIFICANT CHANGE UP (ref 0.7–1.5)
EOSINOPHIL # BLD AUTO: 0.06 K/UL — SIGNIFICANT CHANGE UP (ref 0–0.7)
EOSINOPHIL NFR BLD AUTO: 0.8 % — SIGNIFICANT CHANGE UP (ref 0–8)
ESTIMATED AVERAGE GLUCOSE: 91 MG/DL — SIGNIFICANT CHANGE UP (ref 68–114)
GLUCOSE SERPL-MCNC: 90 MG/DL — SIGNIFICANT CHANGE UP (ref 70–99)
HCT VFR BLD CALC: 44.2 % — SIGNIFICANT CHANGE UP (ref 42–52)
HDLC SERPL-MCNC: 52 MG/DL — SIGNIFICANT CHANGE UP
HGB BLD-MCNC: 15.2 G/DL — SIGNIFICANT CHANGE UP (ref 14–18)
IMM GRANULOCYTES NFR BLD AUTO: 0 % — LOW (ref 0.1–0.3)
LIPID PNL WITH DIRECT LDL SERPL: 133 MG/DL — HIGH (ref 4–129)
LYMPHOCYTES # BLD AUTO: 2.82 K/UL — SIGNIFICANT CHANGE UP (ref 1.2–3.4)
LYMPHOCYTES # BLD AUTO: 39.1 % — SIGNIFICANT CHANGE UP (ref 20.5–51.1)
MAGNESIUM SERPL-MCNC: 2.2 MG/DL — SIGNIFICANT CHANGE UP (ref 1.8–2.4)
MCHC RBC-ENTMCNC: 28.6 PG — SIGNIFICANT CHANGE UP (ref 27–31)
MCHC RBC-ENTMCNC: 34.4 G/DL — SIGNIFICANT CHANGE UP (ref 32–37)
MCV RBC AUTO: 83.2 FL — SIGNIFICANT CHANGE UP (ref 80–94)
MONOCYTES # BLD AUTO: 0.39 K/UL — SIGNIFICANT CHANGE UP (ref 0.1–0.6)
MONOCYTES NFR BLD AUTO: 5.4 % — SIGNIFICANT CHANGE UP (ref 1.7–9.3)
NEUTROPHILS # BLD AUTO: 3.92 K/UL — SIGNIFICANT CHANGE UP (ref 1.4–6.5)
NEUTROPHILS NFR BLD AUTO: 54.3 % — SIGNIFICANT CHANGE UP (ref 42.2–75.2)
NRBC # BLD: 0 /100 WBCS — SIGNIFICANT CHANGE UP (ref 0–0)
PLATELET # BLD AUTO: 178 K/UL — SIGNIFICANT CHANGE UP (ref 130–400)
POTASSIUM SERPL-MCNC: 4 MMOL/L — SIGNIFICANT CHANGE UP (ref 3.5–5)
POTASSIUM SERPL-SCNC: 4 MMOL/L — SIGNIFICANT CHANGE UP (ref 3.5–5)
PROT SERPL-MCNC: 6.6 G/DL — SIGNIFICANT CHANGE UP (ref 6–8)
RBC # BLD: 5.31 M/UL — SIGNIFICANT CHANGE UP (ref 4.7–6.1)
RBC # FLD: 11.1 % — LOW (ref 11.5–14.5)
SODIUM SERPL-SCNC: 140 MMOL/L — SIGNIFICANT CHANGE UP (ref 135–146)
TOTAL CHOLESTEROL/HDL RATIO MEASUREMENT: 3.8 RATIO — LOW (ref 4–5.5)
TRIGL SERPL-MCNC: 95 MG/DL — SIGNIFICANT CHANGE UP (ref 10–149)
TSH SERPL-MCNC: 0.88 UIU/ML — SIGNIFICANT CHANGE UP (ref 0.27–4.2)
WBC # BLD: 7.22 K/UL — SIGNIFICANT CHANGE UP (ref 4.8–10.8)
WBC # FLD AUTO: 7.22 K/UL — SIGNIFICANT CHANGE UP (ref 4.8–10.8)

## 2020-08-21 PROCEDURE — 99231 SBSQ HOSP IP/OBS SF/LOW 25: CPT

## 2020-08-21 RX ORDER — RISPERIDONE 4 MG/1
25 TABLET ORAL ONCE
Refills: 0 | Status: COMPLETED | OUTPATIENT
Start: 2020-08-21 | End: 2020-08-25

## 2020-08-21 RX ADMIN — RISPERIDONE 2 MILLIGRAM(S): 4 TABLET ORAL at 20:53

## 2020-08-21 RX ADMIN — FLUVOXAMINE MALEATE 50 MILLIGRAM(S): 25 TABLET ORAL at 20:53

## 2020-08-21 RX ADMIN — RISPERIDONE 2 MILLIGRAM(S): 4 TABLET ORAL at 08:32

## 2020-08-21 RX ADMIN — Medication 1 MILLIGRAM(S): at 20:52

## 2020-08-21 RX ADMIN — Medication 1 MILLIGRAM(S): at 20:53

## 2020-08-21 RX ADMIN — Medication 1 MILLIGRAM(S): at 08:32

## 2020-08-21 RX ADMIN — FLUVOXAMINE MALEATE 100 MILLIGRAM(S): 25 TABLET ORAL at 08:31

## 2020-08-21 NOTE — PROGRESS NOTE BEHAVIORAL HEALTH - NSBHFUPINTERVALHXFT_PSY_A_CORE
The patient was seen in bed during morning rounds. He states he slept "so-so" last night and is tired, but his mood is "okay" and "better" today. Denies SI/HI/AVH, and states intrusive thoughts were "better" yesterday.

## 2020-08-21 NOTE — CHART NOTE - NSCHARTNOTEFT_GEN_A_CORE
The treatment team met with pt. to review treatment plan, medications and discharge plan.  Pt. was laying in bed during the interview but was alert.  He continues to report intrusive thoughts and some Sabianism preoccupation.  Writer encouraged pt. to not remain in bed and reviewed how this could increase his symptoms of depression.  Writer also reviewed with pt. the importance of improving coping skills which group attendance could greatly assist him with .  Pt. verbalized an understanding of this.  Pt. denies any suicidal or homicidal ideation at this time or any A/V hallucinations.  He remains compliant with his medications and is due for a Risperdal Consta injection on this date.    Once stable for discharge, pt. will return to his mother's home.  He will continue to follow up with his ACT team in the community.  There are no known discharge barriers at this time.    Mental Status Exam:      Mood-  Neutral    Sleep--Normal    Appetite-  Normal    ADL's-  Fair    Thought Process-  Intrusive thoughts    Observation-  Routine    Pt. is not yet stable for discharge on this date.

## 2020-08-21 NOTE — PROGRESS NOTE BEHAVIORAL HEALTH - SUMMARY
Summary (include case differential, formulation and patient response to therapy): Patient is a 30-year-old male with past psychiatric history of schizoaffective disorder, nonspecified, recent IPP discharge 7/15. Patient admitted due to vague suicidal ideation in context of medication non-adherence.     Patient's intrusive thoughts continue to improve. No suicidal ideation, no homicidal ideation, no overt psychosis noted. Patient was encouraged to attend groups and walk more today to avoid sleep disturbance.    Schizoaffective disorder      - Luvox 100 mg PO morning, Luvox 50 mg PO nightly      - Risperidone 2 mg PO BID      - Risperdal consta 25 mg long-acting injectable, last dose 8/7/20; next dose tomorrow 9/7/20    Rigidity/EPS      - Benztropine 1 mg PO BID      - Benztropine 1 mg PO PRN, Q12H    Tobacco use      - Nicotine gum 4 mg PO PRN Q2H     Metabolic charting      - Follow up labs    Insomnia:      - Lorazepam 1 mg PO nightly     Acute anxiety      - Lorazepam 1 mg PO PRN Q8H. Summary (include case differential, formulation and patient response to therapy): Patient is a 30-year-old male with past psychiatric history of schizoaffective disorder, nonspecified, recent IPP discharge 7/15. Patient admitted due to vague suicidal ideation in context of medication non-adherence.     Patient's intrusive thoughts continue to improve. No suicidal ideation, no homicidal ideation, no overt psychosis noted. Patient was encouraged to attend groups and walk more today to avoid sleep disturbance.    Schizoaffective disorder      - Luvox 100 mg PO morning, Luvox 50 mg PO nightly      - Risperidone 2 mg PO BID      - Risperdal consta 25 mg long-acting injectable, last dose 8/7/20; next dose today, 8/21/20    Rigidity/EPS      - Benztropine 1 mg PO BID      - Benztropine 1 mg PO PRN, Q12H    Tobacco use      - Nicotine gum 4 mg PO PRN Q2H     Metabolic charting      - Follow up labs    Insomnia:      - Lorazepam 1 mg PO nightly     Acute anxiety      - Lorazepam 1 mg PO PRN Q8H.

## 2020-08-22 RX ADMIN — RISPERIDONE 2 MILLIGRAM(S): 4 TABLET ORAL at 23:50

## 2020-08-22 RX ADMIN — FLUVOXAMINE MALEATE 100 MILLIGRAM(S): 25 TABLET ORAL at 08:12

## 2020-08-22 RX ADMIN — Medication 1 MILLIGRAM(S): at 23:50

## 2020-08-22 RX ADMIN — Medication 1 MILLIGRAM(S): at 08:11

## 2020-08-22 RX ADMIN — RISPERIDONE 2 MILLIGRAM(S): 4 TABLET ORAL at 08:11

## 2020-08-22 RX ADMIN — FLUVOXAMINE MALEATE 50 MILLIGRAM(S): 25 TABLET ORAL at 23:51

## 2020-08-23 RX ADMIN — FLUVOXAMINE MALEATE 50 MILLIGRAM(S): 25 TABLET ORAL at 23:26

## 2020-08-23 RX ADMIN — FLUVOXAMINE MALEATE 100 MILLIGRAM(S): 25 TABLET ORAL at 08:10

## 2020-08-23 RX ADMIN — Medication 1 MILLIGRAM(S): at 08:09

## 2020-08-23 RX ADMIN — RISPERIDONE 2 MILLIGRAM(S): 4 TABLET ORAL at 08:09

## 2020-08-23 RX ADMIN — Medication 1 MILLIGRAM(S): at 23:24

## 2020-08-23 RX ADMIN — RISPERIDONE 2 MILLIGRAM(S): 4 TABLET ORAL at 23:24

## 2020-08-23 RX ADMIN — Medication 1 MILLIGRAM(S): at 23:25

## 2020-08-24 PROCEDURE — 99231 SBSQ HOSP IP/OBS SF/LOW 25: CPT

## 2020-08-24 RX ADMIN — Medication 1 MILLIGRAM(S): at 20:39

## 2020-08-24 RX ADMIN — RISPERIDONE 2 MILLIGRAM(S): 4 TABLET ORAL at 20:39

## 2020-08-24 RX ADMIN — RISPERIDONE 2 MILLIGRAM(S): 4 TABLET ORAL at 08:21

## 2020-08-24 RX ADMIN — Medication 1 MILLIGRAM(S): at 08:21

## 2020-08-24 RX ADMIN — FLUVOXAMINE MALEATE 50 MILLIGRAM(S): 25 TABLET ORAL at 20:40

## 2020-08-24 RX ADMIN — FLUVOXAMINE MALEATE 100 MILLIGRAM(S): 25 TABLET ORAL at 08:21

## 2020-08-24 NOTE — PROGRESS NOTE BEHAVIORAL HEALTH - NSBHFUPINTERVALCCFT_PSY_A_CORE
Pt is pleasant on approach.  No s/h ideation or overt psychosis. No behavior issues.  D/c planning was discussed with patient; he expressed his desire to stay in the hospital ("I like it here....it's nice').  Psychoed and encouragement done as d/c to home is being planned for this week.  Pt has no hx of self harm.  No c/o intrusive thoughts or Samaritan preoccupation

## 2020-08-24 NOTE — CHART NOTE - NSCHARTNOTEFT_GEN_A_CORE
The treatment team met with pt. to review treatment plan, medications and discharge plan.  Pt. reports he is feeling better.  However, when discharge planning was discussed, pt. states he does not want to leave the hospital and is much more comfortable here.  Writer and the treatment team explained to pt. he can not remain here, however, pt. maintains he does not want to leave in spite of no longer needing inpatient services.  He would not engage in any meaningful dialogue with writer.  Pt. denies any suicidal or homicidal ideations or any A/V hallucinations.  He does remain isolative to his room, though, less so.  Writer continues to encourage pt. to engage in unit activities.    Pt. will be discharged back to his mother's home in the community.  He will continue to follow up with his ACT team as well.  There are no known discharge barriers at this time.    Mental Status Exam:    Mood-  Euthymic    Sleep-  Normal    Appetite-  Normal    ADL's-  Fair    Thought Process- Linear/Perseverative    Observation Routine

## 2020-08-25 PROCEDURE — 99231 SBSQ HOSP IP/OBS SF/LOW 25: CPT

## 2020-08-25 RX ADMIN — Medication 1 MILLIGRAM(S): at 20:51

## 2020-08-25 RX ADMIN — RISPERIDONE 2 MILLIGRAM(S): 4 TABLET ORAL at 08:21

## 2020-08-25 RX ADMIN — Medication 4 MILLIGRAM(S): at 20:52

## 2020-08-25 RX ADMIN — RISPERIDONE 25 MILLIGRAM(S): 4 TABLET ORAL at 09:16

## 2020-08-25 RX ADMIN — Medication 1 MILLIGRAM(S): at 08:21

## 2020-08-25 RX ADMIN — FLUVOXAMINE MALEATE 100 MILLIGRAM(S): 25 TABLET ORAL at 08:22

## 2020-08-25 RX ADMIN — RISPERIDONE 2 MILLIGRAM(S): 4 TABLET ORAL at 20:51

## 2020-08-25 RX ADMIN — FLUVOXAMINE MALEATE 50 MILLIGRAM(S): 25 TABLET ORAL at 20:52

## 2020-08-25 NOTE — PROGRESS NOTE BEHAVIORAL HEALTH - MODIFICATIONS
seen/discussed with resident.  Pt is ambivalent about returning to live with mother.  Pt and mother spoke today; will continue to encourage pt and proceed with d/c planning

## 2020-08-25 NOTE — CHART NOTE - NSCHARTNOTEFT_GEN_A_CORE
spoke with Joe from pt.'s ACT team on this [phone]. Discharge planning was discussed and a clinical update was provided.   will continue to communicate with pt.'s ACT team.

## 2020-08-25 NOTE — PROGRESS NOTE BEHAVIORAL HEALTH - SUMMARY
Summary (include case differential, formulation and patient response to therapy): Patient is a 30-year-old male with past psychiatric history of schizoaffective disorder, nonspecified, recent IPP discharge 7/15. Patient admitted due to vague suicidal ideation in context of medication non-adherence.     Patient's intrusive thoughts continue to improve, though his Gnosticist preoccupation and fear of God's judgement continue at times. No suicidal ideation, no homicidal ideation, no overt psychosis noted. Patient remains somewhat resistant to discharge planning.     Schizoaffective disorder      - Luvox 100 mg PO morning, Luvox 50 mg PO nightly      - Risperidone 2 mg PO BID      - Risperdal consta 25 mg long-acting injectable, last dose 8/7/20; next dose pending    Rigidity/EPS      - Benztropine 1 mg PO BID      - Benztropine 1 mg PO PRN, Q12H    Tobacco use      - Nicotine gum 4 mg PO PRN Q2H     Metabolic charting      - Follow up labs    Insomnia:      - Lorazepam 1 mg PO nightly     Acute anxiety      - Lorazepam 1 mg PO PRN Q8H.

## 2020-08-25 NOTE — CHART NOTE - NSCHARTNOTEFT_GEN_A_CORE
Umberto remains on the unit for continued treatment, safety, and observation.  met with Umberto one on one today to discuss the progress he feels he has made since being on the unit and to encourage group attendance.  Umberto reports feeling “so-so” and said his face feels “weird”, but overall, feels better than on admission.  Umberto noted that he still doesn’t feel “ready” to go home and he likes it here in the hospital because it is “safe”.   listened attentively, providing empathy, support, and education.  When asked if he has any specific concerns regarding returning home to live with his mother, Umberto said “no”.  He reports feeling overall safer in the hospital due to “everything”.  SW provided encouragement and education about functioning outside of the hospital.      Umberto continues to refuse groups despite encouragement.  He spends most of the day either isolative to his room or pacing the hallways.  He interacts with staff and peers minimally.       will continue to meet with patient for education, support, and to encourage group attendance.

## 2020-08-25 NOTE — PROGRESS NOTE BEHAVIORAL HEALTH - NSBHFUPINTERVALHXFT_PSY_A_CORE
Patient seen in bed on morning rounds with treatment. Patient states his sleep was better last night, but continues to have low energy. He states he still has some thoughts fearing God's judgement, but much less so than before. He denies active suicidal ideation, homicidal ideation, or having audio or visual hallucinations. P

## 2020-08-26 VITALS
HEART RATE: 94 BPM | DIASTOLIC BLOOD PRESSURE: 59 MMHG | SYSTOLIC BLOOD PRESSURE: 103 MMHG | TEMPERATURE: 97 F | RESPIRATION RATE: 18 BRPM

## 2020-08-26 PROCEDURE — 99231 SBSQ HOSP IP/OBS SF/LOW 25: CPT

## 2020-08-26 RX ADMIN — Medication 1 MILLIGRAM(S): at 20:05

## 2020-08-26 RX ADMIN — FLUVOXAMINE MALEATE 100 MILLIGRAM(S): 25 TABLET ORAL at 08:16

## 2020-08-26 RX ADMIN — Medication 1 MILLIGRAM(S): at 08:14

## 2020-08-26 RX ADMIN — RISPERIDONE 2 MILLIGRAM(S): 4 TABLET ORAL at 08:15

## 2020-08-26 RX ADMIN — RISPERIDONE 2 MILLIGRAM(S): 4 TABLET ORAL at 20:05

## 2020-08-26 RX ADMIN — FLUVOXAMINE MALEATE 50 MILLIGRAM(S): 25 TABLET ORAL at 20:05

## 2020-08-26 NOTE — CHART NOTE - NSCHARTNOTEFT_GEN_A_CORE
The treatment team met with pt. to review treatment plan, medications and discharge plan.  Pt. reports he is feeling better.  However, when discharge planning was discussed, pt. states he does not want to leave the hospital and is much more comfortable here.  Writer and the treatment team explained to pt. he can not remain here, however, pt. maintains he does not want to leave in spite of no longer needing inpatient services.  Treatment team spoke in length with pt about the need for D/C pt reports he feels comfortable with being D/C Monday. Team agreed.     Pt. will be discharged back to his mother's home in the community.  He will continue to follow up with his ACT team as well.  There are no known discharge barriers at this time.     Mental Status Exam:    Mood-  Euthymic    Sleep-  Normal    Appetite-  Normal    ADL's-  Fair    Thought Process- Linear/Perseverative    Observation Routine.

## 2020-08-26 NOTE — PROGRESS NOTE BEHAVIORAL HEALTH - NSBHFUPINTERVALCCFT_PSY_A_CORE
Pt is goal directed and logical.  He is out of his room more, and is interacting more with other patients.  He exhibits some Jew preoccupation, but is able to be redirected and calmed with little effort.    Continuing to encourage patient to accept return to live with mother in [facility]

## 2020-08-27 PROCEDURE — 99231 SBSQ HOSP IP/OBS SF/LOW 25: CPT

## 2020-08-27 RX ORDER — RISPERIDONE 4 MG/1
2 TABLET ORAL AT BEDTIME
Refills: 0 | Status: DISCONTINUED | OUTPATIENT
Start: 2020-08-27 | End: 2020-08-31

## 2020-08-27 RX ORDER — RISPERIDONE 4 MG/1
1 TABLET ORAL DAILY
Refills: 0 | Status: DISCONTINUED | OUTPATIENT
Start: 2020-08-27 | End: 2020-08-31

## 2020-08-27 RX ADMIN — RISPERIDONE 2 MILLIGRAM(S): 4 TABLET ORAL at 20:18

## 2020-08-27 RX ADMIN — Medication 1 MILLIGRAM(S): at 20:18

## 2020-08-27 RX ADMIN — Medication 1 MILLIGRAM(S): at 08:02

## 2020-08-27 RX ADMIN — RISPERIDONE 2 MILLIGRAM(S): 4 TABLET ORAL at 08:01

## 2020-08-27 RX ADMIN — Medication 1 MILLIGRAM(S): at 20:20

## 2020-08-27 RX ADMIN — FLUVOXAMINE MALEATE 50 MILLIGRAM(S): 25 TABLET ORAL at 20:20

## 2020-08-27 RX ADMIN — FLUVOXAMINE MALEATE 100 MILLIGRAM(S): 25 TABLET ORAL at 08:49

## 2020-08-27 NOTE — PROGRESS NOTE BEHAVIORAL HEALTH - MODIFICATIONS
Seen/discussed with resident.  No s/h ideation. More verbal and not overtly psychotic. In agreement with plans to return with mother next weelk. lowering risperdal as indictated

## 2020-08-27 NOTE — PROGRESS NOTE BEHAVIORAL HEALTH - SUMMARY
Summary (include case differential, formulation and patient response to therapy): Patient is a 30-year-old male with past psychiatric history of schizoaffective disorder, nonspecified, recent [facility] discharge 7/15. Patient admitted due to vague suicidal ideation in context of medication non-adherence.     Patient's intrusive thoughts continue to improve, though his Muslim preoccupation and fear of God's judgement continue at times. No suicidal ideation, no homicidal ideation, no overt psychosis noted. Patient remains somewhat resistant to discharge planning.     Schizoaffective disorder      - Luvox 100 mg PO morning, Luvox 50 mg PO nightly      - Risperidone 2 mg PO BID      - Risperdal consta 25 mg long-acting injectable, last dose 8/7/20; next dose pending    Rigidity/EPS      - Benztropine 1 mg PO BID      - Benztropine 1 mg PO PRN, Q12H    Tobacco use      - Nicotine gum 4 mg PO PRN Q2H     Metabolic charting      - Follow up labs    Insomnia:      - Lorazepam 1 mg PO nightly     Acute anxiety      - Lorazepam 1 mg PO PRN Q8H. Summary (include case differential, formulation and patient response to therapy): Patient is a 30-year-old male with past psychiatric history of schizoaffective disorder, nonspecified, recent [facility] discharge 7/15. Patient admitted due to vague suicidal ideation in context of medication non-adherence.     Patient's intrusive thoughts continue to improve, though his Uatsdin preoccupation remains, but patient is easily redirected. No suicidal ideation, no homicidal ideation, no overt psychosis noted. Patient remains somewhat resistant to discharge planning.     Schizoaffective disorder      - Luvox 100 mg PO morning, Luvox 50 mg PO nightly      - Risperidone 1 mg PO morning, Risperidone 2 mg PO nightly,       - Risperdal consta 25 mg long-acting injectable, last dose 8/25/20    Rigidity/EPS      - Benztropine 1 mg PO BID      - Benztropine 1 mg PO PRN, Q12H    Tobacco use      - Nicotine gum 4 mg PO PRN Q2H     Metabolic charting      - Follow up labs    Insomnia:      - Lorazepam 1 mg PO nightly     Acute anxiety      - Lorazepam 1 mg PO PRN Q8H.

## 2020-08-27 NOTE — PROGRESS NOTE BEHAVIORAL HEALTH - NSBHFUPINTERVALHXFT_PSY_A_CORE
On approach, patient appears tired and depressed. Patient was interviewed at bedside. He reports having a hard time falling asleep until around 2am despite being up and out of his room yesterday. He reports a "so-so" appetite, eating dinner but not breakfast. Patient reports thoughts being better than when he first came. On approach, patient appears tired and depressed. Patient was interviewed at bedside. He reports having a hard time falling asleep until around 2am despite being up and out of his room yesterday. He reports a "so-so" appetite, eating dinner but not breakfast. Patient reports intrusive thoughts being better than when he first came. Denies SI/HI/AVH.

## 2020-08-28 PROCEDURE — 99231 SBSQ HOSP IP/OBS SF/LOW 25: CPT

## 2020-08-28 RX ADMIN — FLUVOXAMINE MALEATE 100 MILLIGRAM(S): 25 TABLET ORAL at 09:19

## 2020-08-28 RX ADMIN — Medication 1 MILLIGRAM(S): at 20:08

## 2020-08-28 RX ADMIN — Medication 1 MILLIGRAM(S): at 20:07

## 2020-08-28 RX ADMIN — RISPERIDONE 2 MILLIGRAM(S): 4 TABLET ORAL at 20:07

## 2020-08-28 RX ADMIN — Medication 1 MILLIGRAM(S): at 09:20

## 2020-08-28 RX ADMIN — FLUVOXAMINE MALEATE 50 MILLIGRAM(S): 25 TABLET ORAL at 20:09

## 2020-08-28 RX ADMIN — RISPERIDONE 1 MILLIGRAM(S): 4 TABLET ORAL at 09:20

## 2020-08-28 NOTE — DISCHARGE NOTE BEHAVIORAL HEALTH - NSBHDCMEDSFT_PSY_A_CORE
Risperdal Consta 25 mg IM, given on 7/24/20, 8/7/20, 8/25/20; tolerated well  Risperidone 2 mg PO at bedtime restarted on admission; titrated up to Risperidone 2 mg PO BID; reduced to Risperidone 2 mg PO nightly, 1 mg PO in the morning on 8/27/20  Lorazepam 1 mg PO nightly for insomnia standing started   Benztropine 1 mg BID  Lorazepam 1 mg PO PRN for acute anxiety    Luvox 25 mg PO daily started 7/31/20, increased to Luvox 25 mg PO daily

## 2020-08-28 NOTE — PROGRESS NOTE BEHAVIORAL HEALTH - NSBHADMITMEDEDU_PSY_A_CORE
yes...
no
no
yes...
no
yes...
no
yes...
no
yes...
no
no
yes...
no

## 2020-08-28 NOTE — PROGRESS NOTE BEHAVIORAL HEALTH - NSBHADMITIPOBS_PSY_A_CORE
Routine observation
Enhanced supervision
Routine observation

## 2020-08-28 NOTE — DISCHARGE NOTE BEHAVIORAL HEALTH - MEDICATION SUMMARY - MEDICATIONS TO STOP TAKING
I will STOP taking the medications listed below when I get home from the hospital:    benztropine 0.5 mg oral tablet  -- 1 tab(s) by mouth 2 times a day until stopped by MD    haloperidol 5 mg oral tablet  -- 1 tab(s) by mouth once a day  at dinner time until told otherwise by MD    hydrOXYzine hydrochloride 25 mg oral tablet  -- 1 tab(s) by mouth once a day (at bedtime) until told otherwiae by MD    melatonin 3 mg oral tablet  -- 1 tab(s) by mouth once a day (at bedtime) until told otherwise by MD    risperiDONE 3 mg oral tablet  -- 1 tab(s) by mouth every 12 hours until told otherwise by MD

## 2020-08-28 NOTE — PROGRESS NOTE BEHAVIORAL HEALTH - RELATEDNESS
Good
Fair
Fair
Good
Fair
Good
Fair
Fair
Good
Fair
Fair
Good
Good
Fair
Fair
Good
Fair
Good
Good
Fair
Good
Good
Fair

## 2020-08-28 NOTE — PROGRESS NOTE BEHAVIORAL HEALTH - JUDGMENT (REGARDING EVERYDAY EVENTS)
Poor
Good
Poor
Poor
Fair
Poor
Good
Fair
Good
Good
Poor
Fair
Good
Poor
Poor
Fair
Good
Poor
Fair
Good
Fair
Good
Poor
Good
Good
Fair
Fair

## 2020-08-28 NOTE — PROGRESS NOTE BEHAVIORAL HEALTH - GROOMING
Fair
Poor
Fair
Fair
Good
Good
Fair
Good
Fair
Poor
Fair
Poor
Fair
Poor
Fair
Fair
Poor
Poor
Fair
Fair
Poor
Fair
Poor
Good
Good
Poor

## 2020-08-28 NOTE — PROGRESS NOTE BEHAVIORAL HEALTH - AXIS III
none known.

## 2020-08-28 NOTE — PROGRESS NOTE BEHAVIORAL HEALTH - NSBHADMITNEWMED_PSY_A_CORE
yes
no
yes
no
yes
no
yes
no
yes
yes
no
yes
no
no
yes
no

## 2020-08-28 NOTE — PROGRESS NOTE BEHAVIORAL HEALTH - NSBHFUPINTERVALHXFT_PSY_A_CORE
The patient was seen at bedside. He has remained isolative to his room today. He states his mood is "so-so", and that his thoughts, particularly intrusive, religiously-oriented thoughts have no bothered him because he has been asleep today. He remains aware of the plan for discharge Monday, 8/31/20. Denies suicidal or homicidal ideation, or audio or visual hallucinations.

## 2020-08-28 NOTE — PROGRESS NOTE BEHAVIORAL HEALTH - NS ED BHA AXIS I PRIMARY CODE FT
F25.1
F25.9
F25.9
F25.1
F25.9
F25.1
F25.1
F25.9

## 2020-08-28 NOTE — PROGRESS NOTE BEHAVIORAL HEALTH - PRIMARY DX
Schizoaffective disorder, depressive type
Yes
Schizoaffective disorder, depressive type
Schizoaffective disorder, unspecified type
Schizoaffective disorder, unspecified type
Schizoaffective disorder, depressive type
Schizoaffective disorder, unspecified type
Schizoaffective disorder, depressive type
Schizoaffective disorder, depressive type
Schizoaffective disorder, unspecified type

## 2020-08-28 NOTE — DISCHARGE NOTE BEHAVIORAL HEALTH - HPI (INCLUDE ILLNESS QUALITY, SEVERITY, DURATION, TIMING, CONTEXT, MODIFYING FACTORS, ASSOCIATED SIGNS AND SYMPTOMS)
Patient is 31yo M single, unemployed, domiciled with his father, PPH of Schizoaffective disorder, recently admitted to [facility] from 6/23/20-7/15/20 and at [facility] shortly before that (for bizarre behavior, psychosis), presented to the ED today with somatic complaints of "I can't walk" and dizziness. Psychiatry was consulted to evaluate patient for suicidal ideations (expressed to nursing staff).    On arrival to the ED, patient is observed in the hallway, pacing with PCA near by for 1:1. Once in a private room for the interview, patient declines to sit down, explaining, "it makes me dizzy to sit." He is cooperative and engaged in the interview, however, interrupts periodically to state, "I don't feel good, doctor," and "I am very weak." Patient appears to be having several somatic delusions, including "head doesn't feel right," dizziness, poor coordination, weakness, and thoughts that he may have cancer. Patient is also exhibiting Restorationism preoccupations that are distressing for him. Specifically, he describes "bad thoughts" that God will punish him for. He has not been caring for himself as well, siting one week ago as the last time he took a shower. He endorses adequate sleep (7-8hr/nt), but decreased appetite leading to decreased PO intake. Of note, patient is drinking several cups of coffee while in the ED, and declining anything else.    Today patient also presents endorsing SI, which he identifies as new for him. For the last several days he admits to wanting "to pass away because I am tired of life." He has at several times during these couple of days thought of ways to go about this, mentioning one thought to stab himself in the heart with a knife. On further questioning, patient identifies several deterrents, including fear of the act itself and fear of God's punishment for suicide. He has not taken any steps toward a cohesive plan or made any preparatory acts. He admits that if he no longer had "bad thoughts" and no longer felt bad then he would not feel suicidal anymore.    Patient denies other symptoms of depression or caridad, and denies auditory hallucinations. He endorses cigarette smoking daily for the last 1 year, 1-2ppd.  Patient endorses not being compliant with his medications following discharge from [facility] because "I don't think they do anything for me."    Collateral obtained from patient's father, [phone], who was quick to get off the phone stating that he was too busy to talk.  He states that since d/c from [facility] patient has been "walking and thinking."  Reports that his son is not well and he would like the hospital to "keep him for 6 months" and at this time does not wish for the patient to come back home and that he should be d/janette to the care of his mother ([place], [phone]). Is unsure of med compliance since d/c.  Denies that pt endorsed any suicidal or homicidal ideation, intent or plan since returning back home.

## 2020-08-28 NOTE — PROGRESS NOTE BEHAVIORAL HEALTH - BODY HABITUS
Average build
negative
Average build
Well nourished
Average build
Well nourished
Average build
Well nourished
Average build
Average build
Well nourished
Average build
Well nourished
Average build
Well nourished

## 2020-08-28 NOTE — DISCHARGE NOTE BEHAVIORAL HEALTH - NSBHDCRESPONSEFT_PSY_A_CORE
Patient has made significant progress over the course of hospitalization. With continuous psychotherapy from the treatment team and medication, patient reports feeling better, sleeping and eating well. Thought process and insight improved. Pt was calm and cooperative and was in good behavioral control. Patient denied any suicidal or homicidal ideations. Patient denied any auditory or visual hallucinations. Pt was evaluated by treatment team, pt is stable for discharge and patient shows no imminent danger to self, others or property at this time. Pt understands and agrees with treatment plan and follow-up. Psychoeducation provided regarding diagnosis, medications, treatment and follow up. Risks, benefits, alternatives discussed, all questions and concerns addressed and answered. Reviewed crisis intervention with verbalized understanding.

## 2020-08-28 NOTE — PROGRESS NOTE BEHAVIORAL HEALTH - NSBHATTESTNPTRAINEE_PSY_A_CORE
agree

## 2020-08-28 NOTE — PROGRESS NOTE BEHAVIORAL HEALTH - MODIFICATIONS
seen/discussed with resident.  No s/h ideation. no overt psychosis.  Continue tx plan; pt aware of anticipated d/c on 8/31.

## 2020-08-28 NOTE — PROGRESS NOTE BEHAVIORAL HEALTH - NS ED BHA MSE SPEECH RATE
Normal
Slowed
Normal
Slowed
Normal
Slowed
Normal
Slowed
Normal
Slowed
Normal
Slowed
Normal

## 2020-08-28 NOTE — PROGRESS NOTE BEHAVIORAL HEALTH - SUMMARY
Summary (include case differential, formulation and patient response to therapy): Patient is a 30-year-old male with past psychiatric history of schizoaffective disorder, nonspecified, recent [facility] discharge 7/15. Patient admitted due to vague suicidal ideation in context of medication non-adherence.     Patient's intrusive thoughts continue to improve, though his Adventism preoccupation remains, but patient is easily redirected. No suicidal ideation, no homicidal ideation, no overt psychosis noted. Patient is aware of discharge plans for Monday, 8/31/20     Schizoaffective disorder      - Luvox 100 mg PO morning, Luvox 50 mg PO nightly      - Risperidone 1 mg PO morning, Risperidone 2 mg PO nightly,       - Risperdal consta 25 mg long-acting injectable, last dose 8/25/20    Rigidity/EPS      - Benztropine 1 mg PO BID      - Benztropine 1 mg PO PRN, Q12H    Tobacco use      - Nicotine gum 4 mg PO PRN Q2H     Metabolic charting      - Follow up labs    Insomnia:      - Lorazepam 1 mg PO nightly     Acute anxiety      - Lorazepam 1 mg PO PRN Q8H.

## 2020-08-28 NOTE — DISCHARGE NOTE BEHAVIORAL HEALTH - NSBHDCCONDITIONFT_PSY_A_CORE
Patient reports improved intrusive, Anglican thoughts, with reduced fear in God's judgment. He has not endorsed suicidality or psychotic symptoms in over 1 week. Patient has better understanding of the importance of medication compliance.

## 2020-08-28 NOTE — PROGRESS NOTE BEHAVIORAL HEALTH - NSBHADMITDANGERSELF_PSY_A_CORE
unable to care for self
suicidal behavior/unable to care for self/suicidal ideation
suicidal ideation/suicidal behavior/unable to care for self
unable to care for self
suicidal behavior/suicidal ideation/unable to care for self
suicidal behavior/unable to care for self/suicidal ideation
unable to care for self
unable to care for self/suicidal behavior
unable to care for self
unable to care for self/suicidal ideation/suicidal behavior
unable to care for self
suicidal behavior/unable to care for self/suicidal ideation
unable to care for self
suicidal behavior/unable to care for self
unable to care for self/suicidal behavior/suicidal ideation
unable to care for self
unable to care for self/suicidal behavior
unable to care for self/Suicidal ideation/suicidal behavior

## 2020-08-28 NOTE — PROGRESS NOTE BEHAVIORAL HEALTH - NS ED BHA MSE SPEECH VOLUME
Normal
Normal
Soft
Normal
Soft
Normal
Soft
Normal
Soft
Normal
Soft
Normal
Soft
Normal

## 2020-08-28 NOTE — PROGRESS NOTE BEHAVIORAL HEALTH - NSBHFUPTYPE_PSY_A_CORE
Inpatient
Inpatient-On Service Note
Inpatient

## 2020-08-28 NOTE — PROGRESS NOTE BEHAVIORAL HEALTH - CASE SUMMARY
as noted
as clinically indicated
as noted
as noted
acute exacerbation of illness; will continue risperdal titration
as noted

## 2020-08-28 NOTE — PROGRESS NOTE BEHAVIORAL HEALTH - NS ED BHA MED ROS CONSTITUTIONAL SYMPTOMS
Yes
No complaints
Yes
No complaints
No complaints
Yes
No complaints
No complaints
Yes

## 2020-08-28 NOTE — PROGRESS NOTE BEHAVIORAL HEALTH - NSBHPTASSESSDT_PSY_A_CORE
03-Aug-2020 09:50
04-Aug-2020 10:41
05-Aug-2020 13:00
07-Aug-2020 10:10
10-Aug-2020 14:50
11-Aug-2020 14:20
12-Aug-2020 14:30
14-Aug-2020 11:50
18-Aug-2020 10:28
19-Jul-2020 17:07
20-Jul-2020 10:30
21-Jul-2020 11:12
22-Jul-2020 14:22
23-Jul-2020 15:23
24-Jul-2020 12:45
26-Aug-2020 12:38
27-Aug-2020 09:35
27-Jul-2020 13:25
28-Jul-2020 15:37
29-Jul-2020 09:40
30-Jul-2020 10:06
13-Aug-2020 10:05
18-Aug-2020 10:28
02-Aug-2020 21:28
24-Aug-2020 12:23
28-Aug-2020 15:30
05-Aug-2020 10:00
21-Aug-2020 11:35
31-Jul-2020 15:11
06-Aug-2020 10:03
25-Aug-2020 14:09
17-Aug-2020 13:19
19-Aug-2020 14:20

## 2020-08-28 NOTE — PROGRESS NOTE BEHAVIORAL HEALTH - ESTIMATED DISCHARGE DATE
20-Aug-2020
20-Aug-2020
28-Aug-2020
20-Aug-2020

## 2020-08-28 NOTE — PROGRESS NOTE BEHAVIORAL HEALTH - NSBHADMITIPOBSFT_PSY_A_CORE
as clinically indicated
as clinically indicated
as noted
as noted
as clinically indicated
Still complaining of depressed mood as a result of generalized weakness
as noted
as noted
as clinically indicated
as noted
as clinically indicated
as noted
as clinically indicated
as noted
as clinically indicated
as noted
for safety
as clinically indicated
as noted
Suicidal ideation

## 2020-08-28 NOTE — PROGRESS NOTE BEHAVIORAL HEALTH - NS ED BHA MED ROS NEUROLOGICAL
No complaints
Yes
No complaints

## 2020-08-28 NOTE — PROGRESS NOTE BEHAVIORAL HEALTH - IMPULSE CONTROL
Normal
Impaired
Impaired
Normal
Impaired
Normal
Impaired
Impaired/Other
Normal
Impaired
Impaired
Normal
Normal
Impaired
Impaired
Normal
Impaired
Normal
Normal
Impaired
Normal
Normal
Impaired

## 2020-08-28 NOTE — PROGRESS NOTE BEHAVIORAL HEALTH - AFFECT RANGE
Blunted
Constricted
Blunted
Constricted/Blunted
Constricted
Blunted
Constricted/Blunted
Blunted
Constricted
Blunted
Blunted
Constricted/Blunted
Blunted
Constricted/Blunted
Blunted
Constricted
Blunted
Constricted
Blunted

## 2020-08-28 NOTE — PROGRESS NOTE BEHAVIORAL HEALTH - NSBHLOC_PSY_A_CORE
Alert
Lethargic, arousable to verbal stimulus
Alert
Lethargic, arousable to verbal stimulus
Alert
Lethargic, arousable to verbal stimulus
Alert
Lethargic, arousable to verbal stimulus
Lethargic, arousable to verbal stimulus

## 2020-08-28 NOTE — PROGRESS NOTE BEHAVIORAL HEALTH - NS ED BHA MED ROS SKIN
No complaints
Unable to assess
No complaints

## 2020-08-28 NOTE — PROGRESS NOTE BEHAVIORAL HEALTH - NSBHADMITIPBHPROVIDER_PSY_A_CORE
N/A
no
no
N/A
no
N/A
N/A
no
N/A
no
N/A
no
N/A
no
N/A
no

## 2020-08-28 NOTE — DISCHARGE NOTE BEHAVIORAL HEALTH - MEDICATION SUMMARY - MEDICATIONS TO TAKE
I will START or STAY ON the medications listed below when I get home from the hospital:    LORazepam 1 mg oral tablet  -- 1 tab(s) by mouth once a day (at bedtime) until told otherwise by MD MDD:1  -- Indication: For Insomnia    fluvoxaMINE 100 mg oral tablet  -- 1 tab(s) by mouth once a day (in the morning)  until reassessed by a physician   -- Indication: For Intrusive thoughts    fluvoxaMINE 50 mg oral tablet  -- 1 tab(s) by mouth once a day (at bedtime) until reassessed by a physician   -- Indication: For Intrusive thoughts    benztropine 1 mg oral tablet  -- 1 tab(s) by mouth every 12 hours until reassessed by a physician  -- Indication: For EPS    risperiDONE 2 mg oral tablet  -- 1 tab(s) by mouth once a day (at bedtime) until reassessed by a physician   -- Indication: For Schizoaffective disorder    risperiDONE 1 mg oral tablet  -- 1 tab(s) by mouth once a day (in the morning) until reassessed by a physician   -- Indication: For Schizoaffective disorder    risperiDONE 25 mg/2 weeks intramuscular injection, extended release  -- 25 milligram(s) intramuscular once  -- Indication: For Schizoaffective disorder    Nicorette 4 mg oral transmucosal gum  -- 1 gum oral transmucosal every 2 to 4 hours until reassessed by a physician   -- Indication: For Tobacco use I will START or STAY ON the medications listed below when I get home from the hospital:    LORazepam 1 mg oral tablet  -- 1 tab(s) by mouth once a day (at bedtime) until told otherwise by MD MDD:1 tab  -- Indication: For anxiety    fluvoxaMINE 100 mg oral tablet  -- 1 tab(s) by mouth once a day (in the morning)  until reassessed by a physician   -- Indication: For Intrusive thoughts    fluvoxaMINE 50 mg oral tablet  -- 1 tab(s) by mouth once a day (at bedtime) until reassessed by a physician   -- Indication: For Intrusive thoughts    benztropine 1 mg oral tablet  -- 1 tab(s) by mouth every 12 hours until reassessed by a physician  -- Indication: For EPS    risperiDONE 2 mg oral tablet  -- 1 tab(s) by mouth once a day (at bedtime) until reassessed by a physician   -- Indication: For Schizoaffective disorder    risperiDONE 1 mg oral tablet  -- 1 tab(s) by mouth once a day (in the morning) until reassessed by a physician   -- Indication: For Schizoaffective disorder    risperiDONE 25 mg/2 weeks intramuscular injection, extended release  -- 25 milligram(s) intramuscular once  -- Indication: For Schizoaffective disorder    Nicorette 4 mg oral transmucosal gum  -- 1 gum oral transmucosal every 2 to 4 hours until reassessed by a physician   -- Indication: For Tobacco use

## 2020-08-28 NOTE — DISCHARGE NOTE BEHAVIORAL HEALTH - MEDICATION SUMMARY - MEDICATIONS TO CHANGE
I will SWITCH the dose or number of times a day I take the medications listed below when I get home from the hospital:    LORazepam 1 mg oral tablet  -- 1 tab(s) by mouth once a day (at bedtime) until told otherwise by MD MDD:1    risperiDONE 25 mg/2 weeks intramuscular injection, extended release  -- 25 milligram(s) intramuscular every 2 weeks until told otherwise by MD.  Last dose 7/8; next dose 7/22

## 2020-08-28 NOTE — DISCHARGE NOTE BEHAVIORAL HEALTH - NSTOBACCOREFERRAL_GEN_A_NCS
Yes Patient registered and referred to the [place] [facility]. Phone appointment scheduled for 9/2/2020 at 0900./Yes

## 2020-08-28 NOTE — CHART NOTE - NSCHARTNOTEFT_GEN_A_CORE
Social Work Note    The treatment team met with patient to review treatment plan, medications and discharge plan.  Patient reports that he is feeling “so-so”.  He admits to feeling better than when he was admitted to the hospital.  Patient denies any suicidal or homicidal ideations or any A/V hallucinations.  He is mostly isolative to his room during the day.  He is more visible on the unit in the evening, often seen pacing the halls.  He continues to refuse groups and reports that he is “too restless” to sit through a group session.  Writer continues to encourage patient to engage in unit activities and socialize with peers.      Patient will be discharged back to his mother's home in the community.  He will continue to follow up with his ACT team.  Anticipated discharge for Monday 8/31.  There are no known discharge barriers at this time.     Mental Status Exam:    Mood- Euthymic    Sleep- Normal    Appetite- Normal    ADL's- Fair    Thought Process- Linear    Observation Routine.    Patient is not yet psychiatrically stable for discharge at this time. Social Work Note    The treatment team met with patient to review treatment plan, medications and discharge plan.  Patient reports that he is feeling “so-so”.  He admits to feeling better than when he was admitted to the hospital.  Patient denies any suicidal or homicidal ideations or any A/V hallucinations.  He is mostly isolative to his room during the day.  He is more visible on the unit in the evening, often seen pacing the halls.  He continues to refuse groups and reports that he is “too restless” to sit through a group session.  Writer continues to encourage patient to engage in unit activities and socialize with peers.      Patient will be discharged back to his mother's home in the community.  He will continue to follow up with his ACT team.  Anticipated discharge for Monday 8/31.  There are no known discharge barriers at this time.      spoke to Ms. Roger from patients ACT Team on this date (MsLory Roger [phone]).  Ms. Roger was made aware that patients anticipated discharge date in Monday 8/31.      Mental Status Exam:    Mood- Euthymic    Sleep- Normal    Appetite- Normal    ADL's- Fair    Thought Process- Linear    Observation Routine.    Patient is not yet psychiatrically stable for discharge at this time.

## 2020-08-28 NOTE — DISCHARGE NOTE BEHAVIORAL HEALTH - NSBHDCSWCOMMENTSFT_PSY_A_CORE
EXAM:  CT OF THE ABDOMEN AND PELVIS WITH CONTRAST 



HISTORY:  EPIGASTRIC PAIN.  HERNIA REPAIR IN 2006.  



TECHNIQUE:  Axial CT scan of the abdomen and pelvis was performed following the 
intravenous administration of 95 ml of Omnipaque 300 as the IV contrast.  No 
oral contrast was utilized at the referring physician's request.  A preliminary 
report was provided by [facility].  



Comparison:  None.  



FINDINGS:  No calcified gallstones are seen within the gallbladder.  



There is probably some mild periportal edema present.  No focal hepatic mass 
evident.  No definite splenic mass seen with slight prominence of the spleen 
size measuring about 12.6 cm in AP x 3.8 cm in transverse diameters and a 
craniocaudal length of about 11.3 cm.  



No definite adrenal, pancreatic, or renal mass identified, however, note is 
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 
a developmental variant.  



Evaluation of the bowel is limited by the lack of oral contrast, however, I 
believe the appendix is visualized as a normal caliber structure with no 
appendicitis evident.  There are some mildly prominent mesenteric nodes in the 
right lower quadrant which may represent a mesenteric adenitis.  Some of the 
small bowel loops have a diffusely slightly thick walled appearance as well and 
findings may represent a mild enteritis.  No free intraperitoneal air or free 
intraperitoneal fluid identified.  



IMPRESSION:  

1.  MILD PERIPORTAL EDEMA IN THE LIVER.  



2.  SLIGHTLY PROMINENT SPLEEN SIZE, BUT NO FOCAL SPLENIC MASS EVIDENT.  



3.  HORSESHOE KIDNEY.  



4.  THE APPENDIX APPEARS NEGATIVE.  NO FREE AIR OR FREE FLUID EVIDENT.  



5.  MILDLY PROMINENT MESENTERIC NODES RIGHT LOWER QUADRANT MAY REPRESENT SOME 
MILD MESENTERIC ADENITIS, AND ALSO SLIGHT PROMINENCE OF THE SMALL BOWEL WALL IN 
PORTIONS OF THE SMALL BOWEL WHICH MAY REPRESENT A MILD ENTERITIS AS WELL.  



JOB NUMBER:  [id number]
MTDD Discharge Summary  [facility] ACT Team Discharge Summary Faxed to [facility] ACT Team [phone] on 08/31/2020 at 12:30pm.

## 2020-08-29 RX ADMIN — FLUVOXAMINE MALEATE 50 MILLIGRAM(S): 25 TABLET ORAL at 23:44

## 2020-08-29 RX ADMIN — Medication 1 MILLIGRAM(S): at 23:43

## 2020-08-29 RX ADMIN — RISPERIDONE 2 MILLIGRAM(S): 4 TABLET ORAL at 23:44

## 2020-08-29 RX ADMIN — RISPERIDONE 1 MILLIGRAM(S): 4 TABLET ORAL at 08:37

## 2020-08-29 RX ADMIN — FLUVOXAMINE MALEATE 100 MILLIGRAM(S): 25 TABLET ORAL at 08:38

## 2020-08-29 RX ADMIN — Medication 1 MILLIGRAM(S): at 08:37

## 2020-08-29 RX ADMIN — Medication 1 MILLIGRAM(S): at 23:44

## 2020-08-30 RX ADMIN — Medication 1 MILLIGRAM(S): at 08:39

## 2020-08-30 RX ADMIN — FLUVOXAMINE MALEATE 100 MILLIGRAM(S): 25 TABLET ORAL at 08:40

## 2020-08-30 RX ADMIN — Medication 1 MILLIGRAM(S): at 20:21

## 2020-08-30 RX ADMIN — Medication 1 MILLIGRAM(S): at 20:20

## 2020-08-30 RX ADMIN — RISPERIDONE 2 MILLIGRAM(S): 4 TABLET ORAL at 20:20

## 2020-08-30 RX ADMIN — FLUVOXAMINE MALEATE 50 MILLIGRAM(S): 25 TABLET ORAL at 20:21

## 2020-08-30 RX ADMIN — RISPERIDONE 1 MILLIGRAM(S): 4 TABLET ORAL at 08:39

## 2020-08-31 PROCEDURE — 99238 HOSP IP/OBS DSCHRG MGMT 30/<: CPT

## 2020-08-31 RX ORDER — RISPERIDONE 4 MG/1
1 TABLET ORAL
Qty: 14 | Refills: 0
Start: 2020-08-31 | End: 2020-09-13

## 2020-08-31 RX ORDER — RISPERIDONE 4 MG/1
25 TABLET ORAL ONCE
Refills: 0 | Status: CANCELLED | OUTPATIENT
Start: 2020-09-08 | End: 2020-08-31

## 2020-08-31 RX ORDER — BENZTROPINE MESYLATE 1 MG
1 TABLET ORAL
Qty: 28 | Refills: 0
Start: 2020-08-31 | End: 2020-09-13

## 2020-08-31 RX ORDER — FLUVOXAMINE MALEATE 25 MG/1
1 TABLET ORAL
Qty: 14 | Refills: 0
Start: 2020-08-31 | End: 2020-09-13

## 2020-08-31 RX ORDER — NICOTINE POLACRILEX 2 MG
1 GUM BUCCAL
Qty: 168 | Refills: 0
Start: 2020-08-31 | End: 2020-09-13

## 2020-08-31 RX ORDER — RISPERIDONE 4 MG/1
25 TABLET ORAL
Qty: 0 | Refills: 0 | DISCHARGE
Start: 2020-08-31

## 2020-08-31 RX ADMIN — FLUVOXAMINE MALEATE 100 MILLIGRAM(S): 25 TABLET ORAL at 08:28

## 2020-08-31 RX ADMIN — RISPERIDONE 1 MILLIGRAM(S): 4 TABLET ORAL at 08:25

## 2020-08-31 RX ADMIN — Medication 1 MILLIGRAM(S): at 08:29

## 2020-08-31 NOTE — DISCHARGE NOTE NURSING/CASE MANAGEMENT/SOCIAL WORK - PATIENT PORTAL LINK FT
You can access the FollowMyHealth Patient Portal offered by [facility] by registering at the following website: [email or web address]. By joining [facility]’s FollowMyHealth portal, you will also be able to view your health information using other applications (apps) compatible with our system.

## 2020-08-31 NOTE — DISCHARGE NOTE NURSING/CASE MANAGEMENT/SOCIAL WORK - NSDCPEWEB_GEN_ALL_CORE
[facility] for Tobacco Control website --- [email or web address] website --- [email or web address]

## 2020-08-31 NOTE — CHART NOTE - NSCHARTNOTEFT_GEN_A_CORE
Social Work Discharge Note:    Patient is for discharge today.   He is alert and oriented x3.  Mood is improved.  Anxiety has decreased.  Insight and judgment have improved.  Suicidal / homicidal ideation denied.      Patient will be discharged to his home with his mother.  Plan is for patient to resume services with [facility].  He is aware and agreeable to same.      This worker spoke with Ms. Roger of ACT Team this morning [phone].      [facility] [phone] provided as an additional resource.     Patient is aware and agreeable to discharge today.

## 2020-08-31 NOTE — DISCHARGE NOTE NURSING/CASE MANAGEMENT/SOCIAL WORK - NSDCPEEMAIL_GEN_ALL_CORE
[facility] for Tobacco Control email [email or web address]

## 2020-09-02 DIAGNOSIS — R29.898 OTHER SYMPTOMS AND SIGNS INVOLVING THE MUSCULOSKELETAL SYSTEM: ICD-10-CM

## 2020-09-02 DIAGNOSIS — G47.00 INSOMNIA, UNSPECIFIED: ICD-10-CM

## 2020-09-02 DIAGNOSIS — Z91.14 PATIENT'S OTHER NONCOMPLIANCE WITH MEDICATION REGIMEN: ICD-10-CM

## 2020-09-02 DIAGNOSIS — F17.210 NICOTINE DEPENDENCE, CIGARETTES, UNCOMPLICATED: ICD-10-CM

## 2020-09-02 DIAGNOSIS — R45.851 SUICIDAL IDEATIONS: ICD-10-CM

## 2020-09-02 DIAGNOSIS — F25.9 SCHIZOAFFECTIVE DISORDER, UNSPECIFIED: ICD-10-CM

## 2020-09-02 DIAGNOSIS — F41.9 ANXIETY DISORDER, UNSPECIFIED: ICD-10-CM

## 2020-09-02 DIAGNOSIS — F25.1 SCHIZOAFFECTIVE DISORDER, DEPRESSIVE TYPE: ICD-10-CM

## 2020-09-02 DIAGNOSIS — Z53.20 PROCEDURE AND TREATMENT NOT CARRIED OUT BECAUSE OF PATIENT'S DECISION FOR UNSPECIFIED REASONS: ICD-10-CM

## 2021-06-03 NOTE — ED PROVIDER NOTE - SHIFT CHANGE DETAILS
How Severe Is Your Skin Lesion?: mild Have Your Skin Lesions Been Treated?: not been treated Is This A New Presentation, Or A Follow-Up?: Skin Lesions pending psych bed, observe in ER.

## 2021-12-17 NOTE — ED ADULT NURSE NOTE - OBJECTIVE STATEMENT
Unk
pt reports weakness throughout his entire body starting today. states, "I feel uncomfortable sitting down". denies alcohol and drug consumption. hx of schizophrenia

## 2022-03-07 NOTE — PROGRESS NOTE BEHAVIORAL HEALTH - NS ED [facility] MED ROS ENT MOUTH
Addended by: Lashanda Nance on: 3/7/2022 01:50 PM     Modules accepted: Orders
No complaints

## 2022-05-05 NOTE — ED BEHAVIORAL HEALTH ASSESSMENT NOTE - CURRENT INTENT
From: hSira Han  To: Tresa Ferguson  Sent: 5/4/2022 6:06 PM CDT  Subject: Is there anywhere I can walk in and get checked?    Be checked over and maybe test for strep and get another Covid test to see if it’s negative… I feel like I shouldn’t wait until Monday and maybe have my friend help me get in… I know all the allergens are high according to shannon, but I need to know if I’m not well, so I can stay in… I have appointments every day except maybe 4 days in this month… I need to know… last time I went to urgent care for this, I was on the bus and I was told that I should have gone to the ER and had a big bill to deal with, so I’m inquiring… I can more than likely get a ride to [facility] with a friend…   None known

## 2022-06-08 NOTE — ED PROVIDER NOTE - NO PERTINENT FAMILY HISTORY IN FIRST DEGREE RELATIVES OF:
START ON PATHWAY REGIMEN - Pancreatic Adenocarcinoma    [id number]        Oxaliplatin (Eloxatin)       Leucovorin       Irinotecan (Camptosar)       Fluorouracil     **Always confirm dose/schedule in your pharmacy ordering system**    Patient Characteristics:  Metastatic Disease, First Line, PS = 0,1, BRCA1/2 and PALB2  Mutation Absent/Unknown  Therapeutic Status: Metastatic Disease  Line of Therapy: First Line  ECOG Performance Status: 1  BRCA1/2 Mutation Status: Awaiting Test Results  PALB2 Mutation Status: Awaiting Test Results  Intent of Therapy:  Non-Curative / Palliative Intent, Discussed with Patient none

## 2022-08-09 NOTE — ED BEHAVIORAL HEALTH ASSESSMENT NOTE - BODY HABITUS
My chart message has been sent to the patient for PATIENT ROUNDING with [facility].  
Well nourished

## 2022-10-28 NOTE — ED BEHAVIORAL HEALTH ASSESSMENT NOTE - SUICIDE PROTECTIVE FACTORS
Additional Notes: Medications complete. Detail Level: Detailed Quality 130: Documentation Of Current Medications In The Medical Record: Current Medications Documented Latter day beliefs/Fear of death or the actual act of killing self/Cultural, spiritual and/or moral attitudes against suicide

## 2023-01-01 NOTE — DISCHARGE NOTE BEHAVIORAL HEALTH - NSBHDCHANDOFF_PSY_A_CORE
[Alert] : alert [Acute Distress] : no acute distress [Normocephalic] : normocephalic [Flat Open Anterior Riparius] : flat open anterior fontanelle [Icteric sclera] : nonicteric sclera [PERRL] : PERRL [Red Reflex Bilateral] : red reflex bilateral [Normally Placed Ears] : normally placed ears [Auricles Well Formed] : auricles well formed [Clear Tympanic membranes] : clear tympanic membranes [Light reflex present] : light reflex present [Bony landmarks visible] : bony landmarks visible [Discharge] : no discharge [Nares Patent] : nares patent [Palate Intact] : palate intact [Uvula Midline] : uvula midline [Supple, full passive range of motion] : supple, full passive range of motion [Palpable Masses] : no palpable masses [Symmetric Chest Rise] : symmetric chest rise [Clear to Auscultation Bilaterally] : clear to auscultation bilaterally [Regular Rate and Rhythm] : regular rate and rhythm [S1, S2 present] : S1, S2 present [Murmurs] : no murmurs [+2 Femoral Pulses] : +2 femoral pulses [Soft] : soft [Tender] : nontender [Distended] : not distended [Bowel Sounds] : bowel sounds present yes [Hepatomegaly] : no hepatomegaly [Splenomegaly] : no splenomegaly [Normal external genitailia] : normal external genitalia [Central Urethral Opening] : central urethral opening [Testicles Descended Bilaterally] : testicles descended bilaterally [Patent] : patent [Normally Placed] : normally placed [No Abnormal Lymph Nodes Palpated] : no abnormal lymph nodes palpated [Segovia-Ortolani] : negative Segovia-Ortolani [Symmetric Flexed Extremities] : symmetric flexed extremities [Spinal Dimple] : no spinal dimple [Tuft of Hair] : no tuft of hair [Straight] : straight [Startle Reflex] : startle reflex present [Suck Reflex] : suck reflex present [Rooting] : rooting reflex present [Palmar Grasp] : palmar grasp reflex present [Plantar Grasp] : plantar grasp reflex present [Symmetric Dustin] : symmetric Berthoud [Jaundice] : not jaundice

## 2023-07-17 NOTE — PROGRESS NOTE BEHAVIORAL HEALTH - BEHAVIOR
Patient arrives ambulatory through triage with c/o of a laceration on the left pinky finger that occurred while working on a car. Cooperative

## 2024-03-19 NOTE — PATIENT PROFILE BEHAVIORAL HEALTH - USE THE 5 A'S (ASK, ADVISE, ASSESS, ASSIST, ARRANGE)
[FreeTextEntry1] : CT abdomen and pelvis (12/2023) LOWER CHEST: 5 mm pleural-based RML (3/9) and LLL (3/8) nodules, unchanged and therefore of no significance. Millimeter-sized RLL calcified granuloma LIVER: Within normal limits. BILE DUCTS: Normal caliber. GALLBLADDER: Within normal limits. SPLEEN: Within normal limits. PANCREAS: Within normal limits. ADRENALS: Within normal limits. KIDNEYS/URETERS: Too small to characterize LEFT renal hypodensity is statistically no significance. Symmetric renal enhancement without calculi/ obstructive uropathy. . BLADDER: Within normal limits. REPRODUCTIVE ORGANS: Hysterectomy. No adnexal mass BOWEL: Small hiatus hernia. Underdistended / nonevaluable stomach. No bowel obstruction. Normal appendix. Scattered colonic diverticula without diverticulitis. PERITONEUM: No ascites. VESSELS: Within normal limits. RETROPERITONEUM/LYMPH NODES: No lymphadenopathy. ABDOMINAL WALL: Rectus diastasis and small fat-containing umbilical hernia BONES: Lower thoracic/lumbar spondylosis. Mild bilateral hip osteoarthritis IMPRESSION: No acute abdominal/pelvic pathology or primary etiology of pain.  DEXA (01/2024): Spine: -1.5, osteopenia; previously -1.7. Femoral neck: -1.1 , osteopenia; previously -0.8. Total hip: 0.0 , normal; previously 0.4. IMPRESSION: Osteopenia. FRACTURE RISK: Using the FRAX 10 year fracture risk calculator the patient's ten-year risk of any fracture is 5.8% and the patient's risk of hip fracture is 1.0%. Additional risk factors used in the FRAX calculation: None. TREATMENT RECOMMENDATIONS: Based on NOF treatment guidelines medical therapy is not recommended at this time.  Wrist US (09/2023): There is no discrete osseous erosion. There is no evidence of tenosynovitis or hyperemic blood flow to suggest synovitis within the left wrist. The visualized tendons are unremarkable. Impression: No evidence for acute inflammatory arthropathy.   Knee x-ray (03/2023): Small right knee effusion. No left knee effusion. Bilateral intra-articular tibiofemoral chondrocalcinosis. Bilateral knee tri compartment osteoarthritic changes. No joint margin erosions. Nonspecific small nodular calcification in posterior right knee soft tissues. Unremarkable quadriceps and patellar tendon shadows. No destructive osseous lesions or periosteal reaction.  Statement Selected

## 2025-07-16 NOTE — ED BEHAVIORAL HEALTH ASSESSMENT NOTE - DOMICILED WITH
Goal Outcome Evaluation:      Plan of Care Reviewed With: patient    Overall Patient Progress: no changeOverall Patient Progress: no change    Outcome Evaluation: Patient denies pain, ambulated with PT today, tolerating nebs well    /79 (BP Location: Left arm)   Pulse 86   Temp 98.4  F (36.9  C) (Oral)   Resp 24   Wt 71 kg (156 lb 9.6 oz)   SpO2 93%   BMI 24.53 kg/m      Shift: 9979-3622  VS: Stable on 40L High Flow NC, afebrile  Neuro: Aox4  Behaviors: Calm and cooperative  BG: None  Labs: RN managed K+, Mag & Phos,Cr: 0.68, K+: 3.5, Phos: 3.6, Ma.8, Glu: 85  Respiratory: Course and diminished lung sounds, dry & nonproductive cough, Pt using IS throughout the day  Cardiac: WDL  Pain/Nausea: Denies pain and nausea  PRN: Robitussin x1, Throat lozenge x1  Diet: Low saturated fat Na< 2400mg diet, no caffeine diet   IV Access: R PIV SL  Infusion(s): None  Lines/Drains: None  GI/: Voids spontaneously without difficulty, LBL 7/15/2025  Skin: WDL  Mobility: Ax1   Plan: Continue POC        Family